# Patient Record
Sex: FEMALE | Race: WHITE | Employment: OTHER | URBAN - METROPOLITAN AREA
[De-identification: names, ages, dates, MRNs, and addresses within clinical notes are randomized per-mention and may not be internally consistent; named-entity substitution may affect disease eponyms.]

---

## 2017-02-16 DIAGNOSIS — I10 ESSENTIAL HYPERTENSION, BENIGN: ICD-10-CM

## 2017-02-16 RX ORDER — LISINOPRIL/HYDROCHLOROTHIAZIDE 10-12.5 MG
2 TABLET ORAL DAILY
Qty: 180 TABLET | Refills: 2 | Status: SHIPPED | OUTPATIENT
Start: 2017-02-16 | End: 2017-06-09

## 2017-02-16 RX ORDER — METOPROLOL SUCCINATE 50 MG/1
50 TABLET, EXTENDED RELEASE ORAL DAILY
Qty: 90 TABLET | Refills: 2 | Status: SHIPPED | OUTPATIENT
Start: 2017-02-16 | End: 2017-11-14

## 2017-02-16 NOTE — TELEPHONE ENCOUNTER
Prescription approved per [facility] [place] Refill Protocol--using diff pharm.

## 2017-02-16 NOTE — TELEPHONE ENCOUNTER
Lisinopril/HCTZ      Last Written Prescription Date: 10/26/16 (to a different pharmacy)  Last Fill Quantity: 180, # refills: 3  Last Office Visit with [place] [facility] [place], [facility] or [facility] prescribing provider: 5/16/16       Potassium   Date Value Ref Range Status   05/16/2016 3.8 3.4 - 5.3 mmol/L Final     Creatinine   Date Value Ref Range Status   05/16/2016 0.83 0.52 - 1.04 mg/dL Final     BP Readings from Last 3 Encounters:   05/16/16 120/80   05/16/16 128/80   04/19/16 138/70       Metoprolol      Last Written Prescription Date: 10/26/16 (to a different pharmacy)  Last Fill Quantity: 90, # refills: 3    Last Office Visit with [place] [facility] [place], [facility] or [facility] prescribing provider:  5/16/16   Future Office Visit: none      BP Readings from Last 3 Encounters:   05/16/16 120/80   05/16/16 128/80   04/19/16 138/70

## 2017-05-30 ENCOUNTER — HOSPITAL ENCOUNTER (OUTPATIENT)
Dept: CARDIOLOGY | Facility: CLINIC | Age: 77
Discharge: HOME | End: 2017-05-30
Attending: INTERNAL MEDICINE | Admitting: INTERNAL MEDICINE
Payer: MEDICARE

## 2017-05-30 DIAGNOSIS — I48.0 PAROXYSMAL ATRIAL FIBRILLATION (H): ICD-10-CM

## 2017-05-30 PROCEDURE — 93306 TTE W/DOPPLER COMPLETE: CPT

## 2017-05-30 PROCEDURE — 93306 TTE W/DOPPLER COMPLETE: CPT | Mod: 26 | Performed by: INTERNAL MEDICINE

## 2017-06-09 ENCOUNTER — OFFICE VISIT (OUTPATIENT)
Dept: CARDIOLOGY | Facility: CLINIC | Age: 77
End: 2017-06-09
Attending: INTERNAL MEDICINE
Payer: COMMERCIAL

## 2017-06-09 VITALS
SYSTOLIC BLOOD PRESSURE: 152 MMHG | DIASTOLIC BLOOD PRESSURE: 90 MMHG | WEIGHT: 124.8 LBS | BODY MASS INDEX: 22.11 KG/M2 | HEART RATE: 59 BPM | HEIGHT: 63 IN

## 2017-06-09 DIAGNOSIS — I48.0 PAROXYSMAL ATRIAL FIBRILLATION (H): Primary | ICD-10-CM

## 2017-06-09 DIAGNOSIS — I10 ESSENTIAL HYPERTENSION, BENIGN: ICD-10-CM

## 2017-06-09 PROCEDURE — 93000 ELECTROCARDIOGRAM COMPLETE: CPT | Performed by: INTERNAL MEDICINE

## 2017-06-09 PROCEDURE — 99213 OFFICE O/P EST LOW 20 MIN: CPT | Performed by: INTERNAL MEDICINE

## 2017-06-09 RX ORDER — LISINOPRIL AND HYDROCHLOROTHIAZIDE 12.5; 2 MG/1; MG/1
2 TABLET ORAL DAILY
Qty: 180 TABLET | Refills: 3 | Status: SHIPPED | OUTPATIENT
Start: 2017-06-09 | End: 2018-05-21

## 2017-06-09 NOTE — PROGRESS NOTES
HPI and Plan:   See dictation    Orders Placed This Encounter   Procedures     Follow-Up with Cardiologist     EKG 12-lead complete w/read - Clinics (performed today)       Orders Placed This Encounter   Medications     aspirin 81 MG tablet     Sig: Take by mouth daily     Psyllium (METAMUCIL FIBER PO)     Sig: Take by mouth daily     lisinopril-hydrochlorothiazide (PRINZIDE/ZESTORETIC) 20-12.5 MG per tablet     Sig: Take 2 tablets by mouth daily     Dispense:  180 tablet     Refill:  3       Medications Discontinued During This Encounter   Medication Reason     aspirin 325 MG tablet Dose adjustment     Docusate Calcium (STOOL SOFTENER PO) Alternate therapy     lisinopril-hydrochlorothiazide (PRINZIDE/ZESTORETIC) 10-12.5 MG per tablet          Encounter Diagnoses   Name Primary?     Paroxysmal atrial fibrillation (H) Yes     Essential hypertension, benign      BENIGN HYPERTENSION        CURRENT MEDICATIONS:  Current Outpatient Prescriptions   Medication Sig Dispense Refill     aspirin 81 MG tablet Take by mouth daily       Psyllium (METAMUCIL FIBER PO) Take by mouth daily       lisinopril-hydrochlorothiazide (PRINZIDE/ZESTORETIC) 20-12.5 MG per tablet Take 2 tablets by mouth daily 180 tablet 3     metoprolol (TOPROL-XL) 50 MG 24 hr tablet Take 1 tablet (50 mg) by mouth daily 90 tablet 2     flecainide (TAMBOCOR) 50 MG tablet Take 1 tablet (50 mg) by mouth 2 times daily 180 tablet 3     Multiple Vitamins-Minerals (PRESERVISION/LUTEIN PO) Take 2 capsules by mouth daily       Calcium Carbonate-Vitamin D (CALCIUM 600 + D OR) Take 1 tablet by mouth daily         ALLERGIES     Allergies   Allergen Reactions     Meperidine Hcl      demerol= nausea       PAST MEDICAL HISTORY:  Past Medical History:   Diagnosis Date     Actinic keratosis     multiple, neris on back      Atrial fibrillation (H)     ablation @ [facility] 1/4/07     Diffuse cystic mastopathy     has prominent chronic L breast cystic structure     Essential  hypertension, benign      First degree AV block 5/9/2016    Per Holter 9/18/15      Generalized osteoarthrosis, unspecified site     hands, L hip     Leiomyoma of uterus, unspecified      S/P AV conner ablation     At [facility], remote history      Unspecified arthropathy, pelvic region and thigh     L hip     Urethral stricture unspecified     inactive       PAST SURGICAL HISTORY:  Past Surgical History:   Procedure Laterality Date     C NONSPECIFIC PROCEDURE      numerous breast aspirations     C NONSPECIFIC PROCEDURE      T&A     C NONSPECIFIC PROCEDURE      left breast bx     C NONSPECIFIC PROCEDURE      numerous breast aspirations     C NONSPECIFIC PROCEDURE      tubal lig (remote)     s/p AV conner ablation      At [facility], remote history      wisdom teeth         FAMILY HISTORY:  Family History   Problem Relation Age of Onset     Arthritis Mother      Neurologic Disorder Mother      parkinsons     Hypertension Mother      Hypertension Father      CEREBROVASCULAR DISEASE Father      2     HEART DISEASE Father      mi x 2     GASTROINTESTINAL DISEASE Father      bleeding ulcers     CANCER Father      prostate     Hypertension Brother      Breast Cancer Maternal Grandmother        SOCIAL HISTORY:  Social History     Social History     Marital status:      Spouse name: Jere     Number of children: 3     Years of education: N/A     Occupational History     volunteer      president of  [employer]     Social History Main Topics     Smoking status: Former Smoker     Smokeless tobacco: Never Used     Alcohol use 0.0 oz/week     0 Standard drinks or equivalent per week      Comment: 1 glass wine per day     Drug use: No     Sexual activity: Yes     Partners: Male     Birth control/ protection: Post-menopausal     Other Topics Concern     Caffeine Concern No     none     Exercise Yes     4-5 a week. bike ,walk     Social History Narrative       Review of Systems:  Skin:  Negative       Eyes:  Positive for  "glasses    ENT:  Negative      Respiratory:  Negative       Cardiovascular:  Negative      Gastroenterology: Negative      Genitourinary:  not assessed      Musculoskeletal:  Positive for arthritis    Neurologic:  Negative      Psychiatric:  Negative      Heme/Lymph/Imm:  Negative      Endocrine:  Positive for night sweats occ    Physical Exam:  Vitals: /90  Pulse 59  Ht 1.6 m (5' 3\")  Wt 56.6 kg (124 lb 12.8 oz)  BMI 22.11 kg/m2    Constitutional:  cooperative, alert and oriented, well developed, well nourished, in no acute distress        Skin:  warm and dry to the touch, no apparent skin lesions or masses noted        Head:  normocephalic, no masses or lesions        Eyes:  pupils equal and round, conjunctivae and lids unremarkable, sclera white, no xanthalasma, EOMS intact, no nystagmus        ENT:  no pallor or cyanosis, dentition good        Neck:  carotid pulses are full and equal bilaterally, JVP normal, no carotid bruit, no thyromegaly        Chest:  normal breath sounds, clear to auscultation, normal A-P diameter, normal symmetry, normal respiratory excursion, no use of accessory muscles          Cardiac: regular rhythm, normal S1/S2, no S3 or S4, apical impulse not displaced, no murmurs, gallops or rubs                  Abdomen:  abdomen soft, non-tender, BS normoactive, no mass, no HSM, no bruits        Vascular: pulses full and equal, no bruits auscultated                                        Extremities and Back:  no deformities, clubbing, cyanosis, erythema observed;no edema              Neurological:  affect appropriate, oriented to time, person and place;no gross motor deficits              CC  Ivan Park MD   [facility]  [address]  [place] [address]              "

## 2017-06-09 NOTE — MR AVS SNAPSHOT
"              After Visit Summary   6/9/2017    Mariluz Echeverria    MRN: [id number]           Patient Information     Date Of Birth          1940        Visit Information        Provider Department      6/9/2017 9:15 AM Ivan Park MD [facility]        Today's Diagnoses     Paroxysmal atrial fibrillation (H)    -  1    Essential hypertension, benign        BENIGN HYPERTENSION           Follow-ups after your visit        Additional Services     Follow-Up with Cardiologist                 Future tests that were ordered for you today     Open Future Orders        Priority Expected Expires Ordered    Follow-Up with Cardiologist Routine 6/9/2018 10/22/2018 6/9/2017            Who to contact     If you have questions or need follow up information about today's clinic visit or your schedule please contact [facility] directly at [phone].  Normal or non-critical lab and imaging results will be communicated to you by MyChart, letter or phone within 4 business days after the clinic has received the results. If you do not hear from us within 7 days, please contact the clinic through MyChart or phone. If you have a critical or abnormal lab result, we will notify you by phone as soon as possible.  Submit refill requests through [employer] or call your pharmacy and they will forward the refill request to us. Please allow 3 business days for your refill to be completed.          Additional Information About Your Visit        MyChart Information     [employer] lets you send messages to your doctor, view your test results, renew your prescriptions, schedule appointments and more. To sign up, go to [email or web address] . Click on \"Log in\" on the left side of the screen, which will take you to the Welcome page. Then click on \"Sign up Now\" on the right side of the page.     You will be asked to enter the access code listed below, as well as " "some personal information. Please follow the directions to create your username and password.     Your access code is: [email or web address]  Expires: 2017 10:28 AM     Your access code will  in 90 days. If you need help or a new code, please call your [place] clinic or [phone].        Care EveryWhere ID     This is your Care EveryWhere ID. This could be used by other organizations to access your [place] medical records  [id number]        Your Vitals Were     Pulse Height BMI (Body Mass Index)             59 1.6 m (5' 3\") 22.11 kg/m2          Blood Pressure from Last 3 Encounters:   17 152/90   16 120/80   16 128/80    Weight from Last 3 Encounters:   17 56.6 kg (124 lb 12.8 oz)   16 56.2 kg (124 lb)   16 56.1 kg (123 lb 9.6 oz)              We Performed the Following     EKG 12-lead complete w/read - Clinics (performed today)     Follow-Up with Cardiologist          Today's Medication Changes          These changes are accurate as of: 17 10:28 AM.  If you have any questions, ask your nurse or doctor.               Start taking these medicines.        Dose/Directions    lisinopril-hydrochlorothiazide 20-12.5 MG per tablet   Commonly known as:  PRINZIDE/ZESTORETIC   Used for:  Essential hypertension, benign   Replaces:  lisinopril-hydrochlorothiazide 10-12.5 MG per tablet   Started by:  Ivan Park MD        Dose:  2 tablet   Take 2 tablets by mouth daily   Quantity:  180 tablet   Refills:  3         These medicines have changed or have updated prescriptions.        Dose/Directions    aspirin 81 MG tablet   This may have changed:  Another medication with the same name was removed. Continue taking this medication, and follow the directions you see here.   Changed by:  Ivan Park MD        Take by mouth daily   Refills:  0         Stop taking these medicines if you haven't already. Please contact your care team if you have questions.     " lisinopril-hydrochlorothiazide 10-12.5 MG per tablet   Commonly known as:  PRINZIDE/ZESTORETIC   Replaced by:  lisinopril-hydrochlorothiazide 20-12.5 MG per tablet   Stopped by:  Ivan Park MD           STOOL SOFTENER PO   Stopped by:  Ivan Park MD                Where to get your medicines      These medications were sent to [facility] [place] [id number] [facility]  [address] [facility] [place] [address]     Phone:  [phone]     lisinopril-hydrochlorothiazide 20-12.5 MG per tablet                Primary Care Provider Office Phone # Fax #    Yohana Mima Falk [phone]       [facility] [address]  [facility] [address]        Thank you!     Thank you for choosing [facility] [place]  for your care. Our goal is always to provide you with excellent care. Hearing back from our patients is one way we can continue to improve our services. Please take a few minutes to complete the written survey that you may receive in the mail after your visit with us. Thank you!             Your Updated Medication List - Protect others around you: Learn how to safely use, store and throw away your medicines at www.disposemymeds.org.          This list is accurate as of: 6/9/17 10:28 AM.  Always use your most recent med list.                   Brand Name Dispense Instructions for use    aspirin 81 MG tablet      Take by mouth daily       CALCIUM 600 + D PO      Take 1 tablet by mouth daily       flecainide 50 MG tablet    TAMBOCOR    180 tablet    Take 1 tablet (50 mg) by mouth 2 times daily       lisinopril-hydrochlorothiazide 20-12.5 MG per tablet    PRINZIDE/ZESTORETIC    180 tablet    Take 2 tablets by mouth daily       METAMUCIL FIBER PO      Take by mouth daily       metoprolol 50 MG 24 hr tablet    TOPROL-XL    90 tablet    Take 1 tablet (50 mg) by mouth daily       PRESERVISION/LUTEIN PO      Take 2  capsules by mouth daily

## 2017-06-09 NOTE — LETTER
6/9/2017    Yohana Falk MD  [address] [facility] [address]  [facility] [address]    RE: Mariluz Echeverria       Dear Colleague,    I had the pleasure of seeing Mariluz Echeverria in the [facility].    I had the opportunity to see Ms. Mariluz Echeverria in Cardiology Clinic today at the [facility] in [place] for reevaluation of paroxysmal atrial fibrillation and hypertension.  As you may recall, she is a 76-year-old woman who is very healthy and active and continues to do well.  She has a history of recurrent atrial fibrillation in the past treated with atrial fibrillation ablation at [facility].  Initially the ablation worked well and she was able to get off anticoagulation and antiarrhythmic drug therapy.  She then had a few episodes of recurrent atrial fibrillation and was placed back on a low dose of flecainide 50 mg p.o. b.i.d. for atrial fibrillation suppression.  She did not tolerate diltiazem for AV conner blockade due to side effects, including constipation.  She was then switched to metoprolol XL and has tolerated that reasonably well.  She continues to be completely free of any symptoms of recurrent atrial fibrillation for the last 5-1/2 years.  She was quite aware of the irregular pounding sensation of her chest when the atrial fibrillation would occur and is quite confident that she has not had any atrial fibrillation recently.      She seemed to do well without any symptoms of recurrent shortness of breath, lightheadedness or chest discomfort.  Her blood pressures tend to be on the high side as they are today.      PHYSICAL EXAMINATION:  Blood pressure was 152/90, heart rate 59 and weight 124 pounds.  Her lungs are clear.  Heart rhythm is quite regular.  She has no cardiac murmurs or carotid bruits.     Outpatient Encounter Prescriptions as of 6/9/2017   Medication Sig Dispense Refill     aspirin 81 MG tablet Take by mouth daily       Psyllium  (METAMUCIL FIBER PO) Take by mouth daily       lisinopril-hydrochlorothiazide (PRINZIDE/ZESTORETIC) 20-12.5 MG per tablet Take 2 tablets by mouth daily 180 tablet 3     metoprolol (TOPROL-XL) 50 MG 24 hr tablet Take 1 tablet (50 mg) by mouth daily 90 tablet 2     flecainide (TAMBOCOR) 50 MG tablet Take 1 tablet (50 mg) by mouth 2 times daily 180 tablet 3     Multiple Vitamins-Minerals (PRESERVISION/LUTEIN PO) Take 2 capsules by mouth daily       Calcium Carbonate-Vitamin D (CALCIUM 600 + D OR) Take 1 tablet by mouth daily       [DISCONTINUED] lisinopril-hydrochlorothiazide (PRINZIDE/ZESTORETIC) 10-12.5 MG per tablet Take 2 tablets by mouth daily 180 tablet 2     [DISCONTINUED] Docusate Calcium (STOOL SOFTENER PO) Take 1 capsule by mouth daily       [DISCONTINUED] aspirin 325 MG tablet Take 325 mg by mouth daily.       No facility-administered encounter medications on file as of 6/9/2017.       IMPRESSIONS:  Ms. Mariluz Echeverria is a 76-year-old woman with hypertension and history of paroxysmal atrial fibrillation and treated somewhat successfully with ablation at [facility] many years ago.  She had from a few breakthrough episodes of atrial fibrillation after that but those seem to have been suppressed nicely with flecainide 50 mg p.o. b.i.d. consistently for the last 5 or 6 years.  At this point, she is not on anticoagulation.  We discussed that again this year and will continue her on her current therapy with the advice that she contact us immediately if she has any recurrent atrial fibrillation so that she can have started on anticoagulation.  I will increase the lisinopril component of her lisinopril/hydrochlorothiazide combination.  She will take 20/12.5, two tablets daily for better blood pressure control.      I will plan to see her back again in 1 year.     Again, thank you for allowing me to participate in the care of your patient.      Sincerely,    DIAZ GONZALEZ MD     [facility]  [facility]

## 2017-06-12 NOTE — PROGRESS NOTES
HISTORY OF PRESENT ILLNESS:  I had the opportunity to see Ms. Mariluz Echeverria in Cardiology Clinic today at the [facility] in [place] for reevaluation of paroxysmal atrial fibrillation and hypertension.  As you may recall, she is a 76-year-old woman who is very healthy and active and continues to do well.  She has a history of recurrent atrial fibrillation in the past treated with atrial fibrillation ablation at [facility].  Initially the ablation worked well and she was able to get off anticoagulation and antiarrhythmic drug therapy.  She then had a few episodes of recurrent atrial fibrillation and was placed back on a low dose of flecainide 50 mg p.o. b.i.d. for atrial fibrillation suppression.  She did not tolerate diltiazem for AV conner blockade due to side effects, including constipation.  She was then switched to metoprolol XL and has tolerated that reasonably well.  She continues to be completely free of any symptoms of recurrent atrial fibrillation for the last 5-1/2 years.  She was quite aware of the irregular pounding sensation of her chest when the atrial fibrillation would occur and is quite confident that she has not had any atrial fibrillation recently.      She seemed to do well without any symptoms of recurrent shortness of breath, lightheadedness or chest discomfort.  Her blood pressures tend to be on the high side as they are today.      PHYSICAL EXAMINATION:  Blood pressure was 152/90, heart rate 59 and weight 124 pounds.  Her lungs are clear.  Heart rhythm is quite regular.  She has no cardiac murmurs or carotid bruits.      IMPRESSIONS:  Ms. Mariluz Echeverria is a 76-year-old woman with hypertension and history of paroxysmal atrial fibrillation and treated somewhat successfully with ablation at [facility] many years ago.  She had from a few breakthrough episodes of atrial fibrillation after that but those seem to have been suppressed nicely with flecainide 50 mg  p.o. b.i.d. consistently for the last 5 or 6 years.  At this point, she is not on anticoagulation.  We discussed that again this year and will continue her on her current therapy with the advice that she contact us immediately if she has any recurrent atrial fibrillation so that she can have started on anticoagulation.  I will increase the lisinopril component of her lisinopril/hydrochlorothiazide combination.  She will take 20/12.5, two tablets daily for better blood pressure control.      I will plan to see her back again in 1 year.      cc:      Yohana Falk MD    [facility]   [address]   [place] [address]         DIAZ GONZALEZ MD, [facility]             D: 2017 17:29   T: 2017 08:51   MT: NAVIN      Name:     MELANIA MORENO   MRN:      [id number]        Account:      [id number]   :      1940           Service Date: 2017      Document: [id number]

## 2017-06-20 ENCOUNTER — TELEPHONE (OUTPATIENT)
Dept: CARDIOLOGY | Facility: CLINIC | Age: 77
End: 2017-06-20

## 2017-06-20 DIAGNOSIS — I10 BENIGN ESSENTIAL HYPERTENSION: Primary | ICD-10-CM

## 2017-06-20 NOTE — TELEPHONE ENCOUNTER
Called concerned about her BP. At OV with Dr. Park on 6/9/17 Lisinopril-HCTZ was increased to 20-12.5 mg 2 tablets daily for elevated BP of 152/90.  Update of BP remains elevated, and would like to get the medication adjustments done while here in [place], [place]   BP's over the past days were taken frequently.  6/16 - 160/90  6/17-- low of 130/86, high of 152/93  6/18-  Low of 123/73, high of 152/90  Asking for BP's of her at rest, similar times of the day rancged from 130//93.   Also on Toprol XL 50 mg daily. Takes in the evening. HR 60-65 bpm.    Will message Dr. Park for his recommendations. Patient aware his is working in Hospital this week.

## 2017-07-03 RX ORDER — AMLODIPINE BESYLATE 5 MG/1
TABLET ORAL
Qty: 30 TABLET | Refills: 11 | Status: SHIPPED | OUTPATIENT
Start: 2017-07-03 | End: 2017-08-03

## 2017-07-03 NOTE — TELEPHONE ENCOUNTER
Phone call to patient to tell her that Dr. Park recommends adding amlodipine 5 MG in the am. i cautioned her that it has the potential to cause some puffiness of feet and ankles and to let us know if this develops. I advised her to check BP's 1-2 hours after taking her BP meds. She agrees with this plan and will keep us informed. Archana

## 2017-07-12 NOTE — TELEPHONE ENCOUNTER
Voice message received from patient that since starting the amlodipine 5 mg for elevated BP, her BP's are now running 120-133 systolic prior to medications and 117-113 after her medications. States she is doing well, just wanted to give us an update on her BP's. No call back needed.

## 2017-08-03 DIAGNOSIS — I10 BENIGN ESSENTIAL HYPERTENSION: ICD-10-CM

## 2017-08-03 RX ORDER — AMLODIPINE BESYLATE 5 MG/1
TABLET ORAL
Qty: 90 TABLET | Refills: 3 | Status: SHIPPED | OUTPATIENT
Start: 2017-08-03 | End: 2018-07-30

## 2017-08-08 ENCOUNTER — HOSPITAL ENCOUNTER (OUTPATIENT)
Dept: MAMMOGRAPHY | Facility: CLINIC | Age: 77
Discharge: HOME | End: 2017-08-08
Attending: INTERNAL MEDICINE | Admitting: INTERNAL MEDICINE
Payer: MEDICARE

## 2017-08-08 ENCOUNTER — OFFICE VISIT (OUTPATIENT)
Dept: INTERNAL MEDICINE | Facility: CLINIC | Age: 77
End: 2017-08-08
Payer: COMMERCIAL

## 2017-08-08 VITALS
SYSTOLIC BLOOD PRESSURE: 118 MMHG | TEMPERATURE: 98.4 F | HEART RATE: 64 BPM | HEIGHT: 63 IN | WEIGHT: 122.7 LBS | DIASTOLIC BLOOD PRESSURE: 76 MMHG | OXYGEN SATURATION: 97 % | BODY MASS INDEX: 21.74 KG/M2

## 2017-08-08 DIAGNOSIS — Z12.31 VISIT FOR SCREENING MAMMOGRAM: ICD-10-CM

## 2017-08-08 DIAGNOSIS — M54.41 RIGHT-SIDED LOW BACK PAIN WITH RIGHT-SIDED SCIATICA, UNSPECIFIED CHRONICITY: ICD-10-CM

## 2017-08-08 DIAGNOSIS — Z00.00 ROUTINE GENERAL MEDICAL EXAMINATION AT A HEALTH CARE FACILITY: Primary | ICD-10-CM

## 2017-08-08 DIAGNOSIS — I48.0 PAROXYSMAL ATRIAL FIBRILLATION (H): ICD-10-CM

## 2017-08-08 PROCEDURE — 77063 BREAST TOMOSYNTHESIS BI: CPT

## 2017-08-08 PROCEDURE — 99397 PER PM REEVAL EST PAT 65+ YR: CPT | Performed by: INTERNAL MEDICINE

## 2017-08-08 NOTE — PROGRESS NOTES
SUBJECTIVE:   Mariluz Echeverria is a 77 year old female who presents for Preventive Visit.      Are you in the first 12 months of your Medicare Part B coverage?  No    Healthy Habits:    Do you get at least three servings of calcium containing foods daily (dairy, green leafy vegetables, etc.)? yes    Amount of exercise or daily activities, outside of work: 5 day(s) per week    Problems taking medications regularly No    Medication side effects: No    Have you had an eye exam in the past two years? yes    Do you see a dentist twice per year? no    Do you have sleep apnea, excessive snoring or daytime drowsiness?no    COGNITIVE SCREEN  1) Repeat 3 items (Banana, Sunrise, Chair)    2) Clock draw: NORMAL  3) 3 item recall: Recalls 3 objects  Results: 3 items recalled: COGNITIVE IMPAIRMENT LESS LIKELY    Mini-CogTM Copyright S Esperanza. Licensed by the author for use in [facility]; reprinted with permission (miriam@[email or web address]). All rights reserved.                    Reviewed and updated as needed this visit by clinical staffTobacco  Allergies  Meds  Med Hx  Surg Hx  Fam Hx  Soc Hx        Reviewed and updated as needed this visit by Provider        Social History   Substance Use Topics     Smoking status: Former Smoker     Smokeless tobacco: Never Used     Alcohol use 0.0 oz/week     0 Standard drinks or equivalent per week      Comment: 1 glass wine per day       The patient does not drink >3 drinks per day nor >7 drinks per week.    Today's PHQ-2 Score:   PHQ-2 ( 1999 [employer]) 8/8/2017 5/16/2016   Q1: Little interest or pleasure in doing things 0 0   Q2: Feeling down, depressed or hopeless 0 0   PHQ-2 Score 0 0         Do you feel safe in your environment - No    Do you have a Health Care Directive?: Yes: Advance Directive has been received and scanned.    Current providers sharing in care for this patient include: Patient Care Team:  Yohana Falk MD as PCP - General (Internal Medicine)      Hearing  impairment: No    Ability to successfully perform activities of daily living: Yes, no assistance needed     Fall risk:  Fallen 2 or more times in the past year?: No  Any fall with injury in the past year?: No      Home safety:  none identified      The following health maintenance items are reviewed in Epic and correct as of today:Health Maintenance   Topic Date Due     ADVANCE DIRECTIVE PLANNING Q5 YRS  07/07/2016     PNEUMOCOCCAL (2 of 2 - PPSV23) 09/08/2016     FALL RISK ASSESSMENT  04/19/2017     INFLUENZA VACCINE (SYSTEM ASSIGNED)  09/01/2017     COLONOSCOPY Q5 YR  12/27/2017     TETANUS IMMUNIZATION (SYSTEM ASSIGNED)  09/03/2019     LIPID SCREEN Q5 YR FEMALE (SYSTEM ASSIGNED)  05/16/2021     DEXA SCAN SCREENING (SYSTEM ASSIGNED)  Completed     BP Readings from Last 3 Encounters:   08/08/17 118/76   06/09/17 152/90   05/16/16 120/80    Wt Readings from Last 3 Encounters:   08/08/17 122 lb 11.2 oz (55.7 kg)   06/09/17 124 lb 12.8 oz (56.6 kg)   05/16/16 124 lb (56.2 kg)                  Patient Active Problem List   Diagnosis     Arthropathy of pelvic region and thigh     Diffuse cystic mastopathy     Urethral stricture     Essential hypertension, benign     Postmenopausal atrophic vaginitis     CARDIOVASCULAR SCREENING; LDL GOAL LESS THAN 130     Advanced directives, counseling/discussion     Urinary retention     First degree AV block     S/P AV conner ablation     Paroxysmal atrial fibrillation (H)     Past Surgical History:   Procedure Laterality Date     C NONSPECIFIC PROCEDURE      numerous breast aspirations     C NONSPECIFIC PROCEDURE      T&A     C NONSPECIFIC PROCEDURE      left breast bx     C NONSPECIFIC PROCEDURE      numerous breast aspirations     C NONSPECIFIC PROCEDURE      tubal lig (remote)     s/p AV conner ablation      At [facility], remote history      wisdom teeth         Social History   Substance Use Topics     Smoking status: Former Smoker     Smokeless tobacco: Never Used     Alcohol use  0.0 oz/week     0 Standard drinks or equivalent per week      Comment: 1 glass wine per day     Family History   Problem Relation Age of Onset     Arthritis Mother      Neurologic Disorder Mother      parkinsons     Hypertension Mother      Hypertension Father      CEREBROVASCULAR DISEASE Father      2     HEART DISEASE Father      mi x 2     GASTROINTESTINAL DISEASE Father      bleeding ulcers     CANCER Father      prostate     Hypertension Brother      Breast Cancer Maternal Grandmother          Current Outpatient Prescriptions   Medication Sig Dispense Refill     amLODIPine (NORVASC) 5 MG tablet Take one 5 MG pill by mouth in the morning 90 tablet 3     aspirin 81 MG tablet Take by mouth daily       Psyllium (METAMUCIL FIBER PO) Take by mouth daily       lisinopril-hydrochlorothiazide (PRINZIDE/ZESTORETIC) 20-12.5 MG per tablet Take 2 tablets by mouth daily 180 tablet 3     metoprolol (TOPROL-XL) 50 MG 24 hr tablet Take 1 tablet (50 mg) by mouth daily 90 tablet 2     flecainide (TAMBOCOR) 50 MG tablet Take 1 tablet (50 mg) by mouth 2 times daily 180 tablet 3     Multiple Vitamins-Minerals (PRESERVISION/LUTEIN PO) Take 2 capsules by mouth daily       Calcium Carbonate-Vitamin D (CALCIUM 600 + D OR) Take 1 tablet by mouth daily                 ROS:  C: NEGATIVE for fever, chills, change in weight  I: NEGATIVE for worrisome rashes, moles or lesions  E: NEGATIVE for vision changes or irritation  E/M: NEGATIVE for ear, mouth and throat problems  R: NEGATIVE for significant cough or SOB  B: NEGATIVE for masses, tenderness or discharge  CV: NEGATIVE for chest pain, palpitations or peripheral edema  GI: NEGATIVE for nausea, abdominal pain, heartburn, or change in bowel habits  : NEGATIVE for frequency, dysuria, or hematuria  MUSCULOSKELETAL:she has ongoing right lower leg pain and right buttock pain at times.   N: NEGATIVE for weakness, dizziness or paresthesias  E: NEGATIVE for temperature intolerance, skin/hair  "changes  H: NEGATIVE for bleeding problems  P: NEGATIVE for changes in mood or affect    OBJECTIVE:   /76  Pulse 64  Temp 98.4  F (36.9  C) (Oral)  Ht 5' 3\" (1.6 m)  Wt 122 lb 11.2 oz (55.7 kg)  SpO2 97%  BMI 21.74 kg/m2 Estimated body mass index is 21.74 kg/(m^2) as calculated from the following:    Height as of this encounter: 5' 3\" (1.6 m).    Weight as of this encounter: 122 lb 11.2 oz (55.7 kg).  EXAM:   GENERAL: healthy, alert and no distress  EYES: Eyes grossly normal to inspection, PERRL and conjunctivae and sclerae normal  HENT: ear canals and TM's normal, nose and mouth without ulcers or lesions  NECK: no adenopathy, no asymmetry, masses, or scars and thyroid normal to palpation  RESP: lungs clear to auscultation - no rales, rhonchi or wheezes  BREAST: normal without masses, tenderness or nipple discharge and no palpable axillary masses or adenopathy  CV: regular rate and rhythm, normal S1 S2, no S3 or S4, no murmur, click or rub, no peripheral edema and peripheral pulses strong  ABDOMEN: soft, nontender, no hepatosplenomegaly, no masses and bowel sounds normal  MS: no gross musculoskeletal defects noted, no edema  SKIN: no suspicious lesions or rashes  NEURO: Normal strength and tone, mentation intact and speech normal  PSYCH: mentation appears normal, affect normal/bright    ASSESSMENT / PLAN:   1. Routine general medical examination at a health care facility     - CBC with platelets; Future  - TSH with free T4 reflex; Future  - Lipid panel reflex to direct LDL; Future  - Comprehensive metabolic panel (BMP + Alb, Alk Phos, ALT, AST, Total. Bili, TP); Future    2. Right-sided low back pain with right-sided sciatica, unspecified chronicity  Offered to check MRI lumbar spine. She declined for now.     3. Paroxysmal atrial fibrillation (H)  under good control Continue current medications.       End of Life Planning:  Patient currently has an advanced directive: No.  I have verified the patient's " "ablity to prepare an advanced directive/make health care decisions.  Literature was provided to assist patient in preparing an advanced directive.    COUNSELING:  Reviewed preventive health counseling, as reflected in patient instructions       Regular exercise       Healthy diet/nutrition        Estimated body mass index is 21.74 kg/(m^2) as calculated from the following:    Height as of this encounter: 5' 3\" (1.6 m).    Weight as of this encounter: 122 lb 11.2 oz (55.7 kg).     reports that she has quit smoking. She has never used smokeless tobacco.        Appropriate preventive services were discussed with this patient, including applicable screening as appropriate for cardiovascular disease, diabetes, osteopenia/osteoporosis, and glaucoma.  As appropriate for age/gender, discussed screening for colorectal cancer, prostate cancer, breast cancer, and cervical cancer. Checklist reviewing preventive services available has been given to the patient.    Reviewed patients plan of care and provided an AVS. The Basic Care Plan (routine screening as documented in Health Maintenance) for Mariluz meets the Care Plan requirement. This Care Plan has been established and reviewed with the Patient.    Counseling Resources:  ATP IV Guidelines  Pooled Cohorts Equation Calculator  Breast Cancer Risk Calculator  FRAX Risk Assessment  ICSI Preventive Guidelines  Dietary Guidelines for Americans, 2010  XTRM's MyPlate  ASA Prophylaxis  Lung CA Screening    Yohana Falk MD  [facility]  "

## 2017-08-08 NOTE — MR AVS SNAPSHOT
After Visit Summary   8/8/2017    Mariluz Echeverria    MRN: [id number]           Patient Information     Date Of Birth          1940        Visit Information        Provider Department      8/8/2017 9:20 AM Yohana Falk MD [facility]        Today's Diagnoses     Routine general medical examination at a health care facility    -  1    Right-sided low back pain with right-sided sciatica, unspecified chronicity        Paroxysmal atrial fibrillation (H)          Care Instructions      Preventive Health Recommendations    Female Ages 65 +    Yearly exam:     See your health care provider every year in order to  o Review health changes.   o Discuss preventive care.    o Review your medicines if your doctor has prescribed any.      You no longer need a yearly Pap test unless you've had an abnormal Pap test in the past 10 years. If you have vaginal symptoms, such as bleeding or discharge, be sure to talk with your provider about a Pap test.      Every 1 to 2 years, have a mammogram.  If you are over 69, talk with your health care provider about whether or not you want to continue having screening mammograms.      Every 10 years, have a colonoscopy. Or, have a yearly FIT test (stool test). These exams will check for colon cancer.       Have a cholesterol test every 5 years, or more often if your doctor advises it.       Have a diabetes test (fasting glucose) every three years. If you are at risk for diabetes, you should have this test more often.       At age 65, have a bone density scan (DEXA) to check for osteoporosis (brittle bone disease).    Shots:    Get a flu shot each year.    Get a tetanus shot every 10 years.    Talk to your doctor about your pneumonia vaccines. There are now two you should receive - Pneumovax (PPSV 23) and Prevnar (PCV 13).    Talk to your doctor about the shingles vaccine.    Talk to your doctor about the hepatitis B vaccine.    Nutrition:     Eat at least  5 servings of fruits and vegetables each day.      Eat whole-grain bread, whole-wheat pasta and brown rice instead of white grains and rice.      Talk to your provider about Calcium and Vitamin D.     Lifestyle    Exercise at least 150 minutes a week (30 minutes a day, 5 days a week). This will help you control your weight and prevent disease.      Limit alcohol to one drink per day.      No smoking.       Wear sunscreen to prevent skin cancer.       See your dentist twice a year for an exam and cleaning.      See your eye doctor every 1 to 2 years to screen for conditions such as glaucoma, macular degeneration and cataracts.          Follow-ups after your visit        Your next 10 appointments already scheduled     Aug 15, 2017   Procedure with Leonarda Gaona MD   [facility] ([facility])    [address] [place] [address] [phone]           [facility] is located at [address] [place]              Future tests that were ordered for you today     Open Future Orders        Priority Expected Expires Ordered    CBC with platelets Routine  11/8/2017 8/8/2017    TSH with free T4 reflex Routine  11/8/2017 8/8/2017    Lipid panel reflex to direct LDL Routine  11/8/2017 8/8/2017    Comprehensive metabolic panel (BMP + Alb, Alk Phos, ALT, AST, Total. Bili, TP) Routine  11/8/2017 8/8/2017    MA Screen Bilateral w/Leonel Routine  8/8/2018 8/7/2017            Who to contact     If you have questions or need follow up information about today's clinic visit or your schedule please contact [facility] directly at [phone].  Normal or non-critical lab and imaging results will be communicated to you by MyChart, letter or phone within 4 business days after the clinic has received the results. If you do not hear from us within 7 days, please contact the clinic through MyChart or phone. If you have a critical or abnormal lab result, we  "will notify you by phone as soon as possible.  Submit refill requests through [employer] or call your pharmacy and they will forward the refill request to us. Please allow 3 business days for your refill to be completed.          Additional Information About Your Visit        logtrusthart Information     [employer] lets you send messages to your doctor, view your test results, renew your prescriptions, schedule appointments and more. To sign up, go to [email or web address] . Click on \"Log in\" on the left side of the screen, which will take you to the Welcome page. Then click on \"Sign up Now\" on the right side of the page.     You will be asked to enter the access code listed below, as well as some personal information. Please follow the directions to create your username and password.     Your access code is: [email or web address]  Expires: 2017 10:28 AM     Your access code will  in 90 days. If you need help or a new code, please call your [place] clinic or [phone].        Care EveryWhere ID     This is your Care EveryWhere ID. This could be used by other organizations to access your [place] medical records  [id number]        Your Vitals Were     Pulse Temperature Height Pulse Oximetry BMI (Body Mass Index)       64 98.4  F (36.9  C) (Oral) 5' 3\" (1.6 m) 97% 21.74 kg/m2        Blood Pressure from Last 3 Encounters:   17 118/76   17 152/90   16 120/80    Weight from Last 3 Encounters:   17 122 lb 11.2 oz (55.7 kg)   17 124 lb 12.8 oz (56.6 kg)   16 124 lb (56.2 kg)               Primary Care Provider Office Phone # Fax #    Yohana Falk [phone]       [facility]  [address]   [facility] [address]        Equal Access to Services     LEILANI HAYNES AH: Hadii elvira weiss Socas, waaxda luqadaha, qaybta jayjay espana ah. [facility] [phone].    ATENCIÓN: Si lilia baer a cuba " disposición servicios gratuitos de asistencia lingüística. Ava simmons [phone].    We comply with applicable federal civil rights laws and [place] laws. We do not discriminate on the basis of race, color, national origin, age, disability sex, sexual orientation or gender identity.            Thank you!     Thank you for choosing [facility]  for your care. Our goal is always to provide you with excellent care. Hearing back from our patients is one way we can continue to improve our services. Please take a few minutes to complete the written survey that you may receive in the mail after your visit with us. Thank you!             Your Updated Medication List - Protect others around you: Learn how to safely use, store and throw away your medicines at www.disposemymeds.org.          This list is accurate as of: 8/8/17 10:00 AM.  Always use your most recent med list.                   Brand Name Dispense Instructions for use Diagnosis    amLODIPine 5 MG tablet    NORVASC    90 tablet    Take one 5 MG pill by mouth in the morning    Benign essential hypertension       aspirin 81 MG tablet      Take by mouth daily        CALCIUM 600 + D PO      Take 1 tablet by mouth daily        flecainide 50 MG tablet    TAMBOCOR    180 tablet    Take 1 tablet (50 mg) by mouth 2 times daily    Paroxysmal atrial fibrillation (H)       lisinopril-hydrochlorothiazide 20-12.5 MG per tablet    PRINZIDE/ZESTORETIC    180 tablet    Take 2 tablets by mouth daily    Essential hypertension, benign       METAMUCIL FIBER PO      Take by mouth daily        metoprolol 50 MG 24 hr tablet    TOPROL-XL    90 tablet    Take 1 tablet (50 mg) by mouth daily    Essential hypertension, benign       PRESERVISION/LUTEIN PO      Take 2 capsules by mouth daily

## 2017-08-08 NOTE — NURSING NOTE
"Chief Complaint   Patient presents with     Medicare Visit     Not fasting       Initial /76  Pulse 64  Temp 98.4  F (36.9  C) (Oral)  Ht 5' 3\" (1.6 m)  Wt 122 lb 11.2 oz (55.7 kg)  SpO2 97%  BMI 21.74 kg/m2 Estimated body mass index is 21.74 kg/(m^2) as calculated from the following:    Height as of this encounter: 5' 3\" (1.6 m).    Weight as of this encounter: 122 lb 11.2 oz (55.7 kg).  Medication Reconciliation: complete     Lynnette Mcintosh CMA      "

## 2017-08-10 ENCOUNTER — TRANSFERRED RECORDS (OUTPATIENT)
Dept: HEALTH INFORMATION MANAGEMENT | Facility: CLINIC | Age: 77
End: 2017-08-10

## 2017-08-11 DIAGNOSIS — Z00.00 ROUTINE GENERAL MEDICAL EXAMINATION AT A HEALTH CARE FACILITY: ICD-10-CM

## 2017-08-11 LAB
ALBUMIN SERPL-MCNC: 3.6 G/DL (ref 3.4–5)
ALP SERPL-CCNC: 45 U/L (ref 40–150)
ALT SERPL W P-5'-P-CCNC: 21 U/L (ref 0–50)
ANION GAP SERPL CALCULATED.3IONS-SCNC: 5 MMOL/L (ref 3–14)
AST SERPL W P-5'-P-CCNC: 22 U/L (ref 0–45)
BILIRUB SERPL-MCNC: 0.7 MG/DL (ref 0.2–1.3)
BUN SERPL-MCNC: 18 MG/DL (ref 7–30)
CALCIUM SERPL-MCNC: 9.1 MG/DL (ref 8.5–10.1)
CHLORIDE SERPL-SCNC: 99 MMOL/L (ref 94–109)
CHOLEST SERPL-MCNC: 229 MG/DL
CO2 SERPL-SCNC: 32 MMOL/L (ref 20–32)
CREAT SERPL-MCNC: 0.79 MG/DL (ref 0.52–1.04)
ERYTHROCYTE [DISTWIDTH] IN BLOOD BY AUTOMATED COUNT: 13.6 % (ref 10–15)
GFR SERPL CREATININE-BSD FRML MDRD: 71 ML/MIN/1.7M2
GLUCOSE SERPL-MCNC: 90 MG/DL (ref 70–99)
HCT VFR BLD AUTO: 37 % (ref 35–47)
HDLC SERPL-MCNC: 128 MG/DL
HGB BLD-MCNC: 12.4 G/DL (ref 11.7–15.7)
LDLC SERPL CALC-MCNC: 91 MG/DL
MCH RBC QN AUTO: 31.1 PG (ref 26.5–33)
MCHC RBC AUTO-ENTMCNC: 33.5 G/DL (ref 31.5–36.5)
MCV RBC AUTO: 93 FL (ref 78–100)
NONHDLC SERPL-MCNC: 101 MG/DL
PLATELET # BLD AUTO: 227 10E9/L (ref 150–450)
POTASSIUM SERPL-SCNC: 3.3 MMOL/L (ref 3.4–5.3)
PROT SERPL-MCNC: 6.7 G/DL (ref 6.8–8.8)
RBC # BLD AUTO: 3.99 10E12/L (ref 3.8–5.2)
SODIUM SERPL-SCNC: 136 MMOL/L (ref 133–144)
TRIGL SERPL-MCNC: 48 MG/DL
TSH SERPL DL<=0.005 MIU/L-ACNC: 1.65 MU/L (ref 0.4–4)
WBC # BLD AUTO: 4.8 10E9/L (ref 4–11)

## 2017-08-11 PROCEDURE — 84443 ASSAY THYROID STIM HORMONE: CPT | Performed by: INTERNAL MEDICINE

## 2017-08-11 PROCEDURE — 80053 COMPREHEN METABOLIC PANEL: CPT | Performed by: INTERNAL MEDICINE

## 2017-08-11 PROCEDURE — 85027 COMPLETE CBC AUTOMATED: CPT | Performed by: INTERNAL MEDICINE

## 2017-08-11 PROCEDURE — 80061 LIPID PANEL: CPT | Performed by: INTERNAL MEDICINE

## 2017-08-11 PROCEDURE — 36415 COLL VENOUS BLD VENIPUNCTURE: CPT | Performed by: INTERNAL MEDICINE

## 2017-08-15 ENCOUNTER — HOSPITAL ENCOUNTER (OUTPATIENT)
Facility: CLINIC | Age: 77
Discharge: HOME | End: 2017-08-15
Attending: COLON & RECTAL SURGERY | Admitting: COLON & RECTAL SURGERY
Payer: MEDICARE

## 2017-08-15 ENCOUNTER — TRANSFERRED RECORDS (OUTPATIENT)
Dept: HEALTH INFORMATION MANAGEMENT | Facility: CLINIC | Age: 77
End: 2017-08-15

## 2017-08-15 VITALS
SYSTOLIC BLOOD PRESSURE: 122 MMHG | BODY MASS INDEX: 21.79 KG/M2 | DIASTOLIC BLOOD PRESSURE: 74 MMHG | HEIGHT: 63 IN | OXYGEN SATURATION: 97 % | WEIGHT: 123 LBS | RESPIRATION RATE: 16 BRPM

## 2017-08-15 LAB — COLONOSCOPY: NORMAL

## 2017-08-15 PROCEDURE — 88305 TISSUE EXAM BY PATHOLOGIST: CPT | Mod: 26 | Performed by: COLON & RECTAL SURGERY

## 2017-08-15 PROCEDURE — 45380 COLONOSCOPY AND BIOPSY: CPT | Performed by: COLON & RECTAL SURGERY

## 2017-08-15 PROCEDURE — 88305 TISSUE EXAM BY PATHOLOGIST: CPT | Performed by: COLON & RECTAL SURGERY

## 2017-08-15 PROCEDURE — 25000128 H RX IP 250 OP 636: Performed by: COLON & RECTAL SURGERY

## 2017-08-15 PROCEDURE — G0500 MOD SEDAT ENDO SERVICE >5YRS: HCPCS | Performed by: COLON & RECTAL SURGERY

## 2017-08-15 RX ORDER — ONDANSETRON 4 MG/1
4 TABLET, ORALLY DISINTEGRATING ORAL EVERY 6 HOURS PRN
Status: DISCONTINUED | OUTPATIENT
Start: 2017-08-15 | End: 2017-08-15 | Stop reason: HOSPADM

## 2017-08-15 RX ORDER — NALOXONE HYDROCHLORIDE 0.4 MG/ML
.1-.4 INJECTION, SOLUTION INTRAMUSCULAR; INTRAVENOUS; SUBCUTANEOUS
Status: DISCONTINUED | OUTPATIENT
Start: 2017-08-15 | End: 2017-08-15 | Stop reason: HOSPADM

## 2017-08-15 RX ORDER — ONDANSETRON 2 MG/ML
4 INJECTION INTRAMUSCULAR; INTRAVENOUS EVERY 6 HOURS PRN
Status: DISCONTINUED | OUTPATIENT
Start: 2017-08-15 | End: 2017-08-15 | Stop reason: HOSPADM

## 2017-08-15 RX ORDER — LIDOCAINE 40 MG/G
CREAM TOPICAL
Status: DISCONTINUED | OUTPATIENT
Start: 2017-08-15 | End: 2017-08-15 | Stop reason: HOSPADM

## 2017-08-15 RX ORDER — FLUMAZENIL 0.1 MG/ML
0.2 INJECTION, SOLUTION INTRAVENOUS
Status: DISCONTINUED | OUTPATIENT
Start: 2017-08-15 | End: 2017-08-15 | Stop reason: HOSPADM

## 2017-08-15 RX ORDER — FENTANYL CITRATE 50 UG/ML
INJECTION, SOLUTION INTRAMUSCULAR; INTRAVENOUS PRN
Status: DISCONTINUED | OUTPATIENT
Start: 2017-08-15 | End: 2017-08-15 | Stop reason: HOSPADM

## 2017-08-15 RX ORDER — ONDANSETRON 2 MG/ML
4 INJECTION INTRAMUSCULAR; INTRAVENOUS
Status: DISCONTINUED | OUTPATIENT
Start: 2017-08-15 | End: 2017-08-15 | Stop reason: HOSPADM

## 2017-08-15 NOTE — OP NOTE
See Provation Note In Chart    Leonarda Gaona MD  [facility]  Office Phone # [phone]

## 2017-08-15 NOTE — DISCHARGE INSTRUCTIONS
Understanding Colon and Rectal Polyps     The colon has a smooth lining composed of millions of cells.     The colon (also called the large intestine) is a muscular tube that forms the last part of the digestive tract. It absorbs water and stores food waste. The colon is about 4 to 6 feet long. The rectum is the last 6 inches of the colon. The colon and rectum have a smooth lining composed of millions of cells. Changes in these cells can lead to growths in the colon that can become cancerous and should be removed.     When the Colon Lining Changes  Changes that occur in the cells that line the colon or rectum can lead to growths called polyps. Over a period of years, polyps can turn cancerous. Removing polyps early may prevent cancer from ever forming.      Polyps  Polyps are fleshy clumps of tissue that form on the lining of the colon or rectum. Small polyps are usually benign (not cancerous). However, over time, cells in a polyp can change and become cancerous. The larger a polyp grows, the more likely this is to happen. Also, certain types of polyps known as adenomatous polyps are considered premalignant. This means that they will almost always become cancerous if they re not removed.          Cancer  Almost all colorectal cancers start when polyp cells begin growing abnormally. As a cancerous tumor grows, it may involve more and more of the colon or rectum. In time, cancer can also grow beyond the colon or rectum and spread to nearby organs or to glands called lymph nodes. The cells can also travel to other parts of the body. This is known as metastasis. The earlier a cancerous tumor is removed, the better the chance of preventing its spread.        [id number] [facility], [address] [place] [address], [place] [address]. All rights reserved. This information is not intended as a substitute for professional medical care. Always follow your healthcare professional's instructions.      Understanding Diverticulosis  and Diverticulitis     Pouches or diverticula usually occur in the lower part of the colon called the sigmoid.      Diverticulitis occurs when the pouches become inflamed.     The colon (large intestine) is the last part of the digestive tract. It absorbs water from stool and changes it from a liquid to a solid. In certain cases, small pouches called diverticula can form in the colon wall. This condition is called diverticulosis. The pouches can become infected. If this happens, it becomes a more serious problem called diverticulitis. These problems can be painful. But they can be managed.   Managing Your Condition  Diet changes or taking medications are often tried first. These may be enough to bring relief. If the case is bad, surgery may be done. You and your doctor can discuss the plan that is best for you.  If You Have Diverticulosis  Diet changes are often enough to control symptoms. The main changes are adding fiber (roughage) and drinking more water. Fiber absorbs water as it travels through your colon. This helps your stool stay soft and move smoothly. Water helps this process. If needed, you may be told to take over-the-counter stool softeners. To help relieve pain, antispasmodic medications may be prescribed.  If You Have Diverticulitis  Treatment depends on how bad your symptoms are.  For mild symptoms: You may be put on a liquid diet for a short time. You may also be prescribed antibiotics. If these two steps relieve your symptoms, you may then be prescribed a high-fiber diet. If you still have symptoms, your doctor will discuss further treatment options with you.  For severe symptoms: You may need to be admitted to the hospital. There, you can be given IV antibiotics and fluids. Once symptoms are under control, the above treatments may be tried. If these don t control your condition, your doctor may discuss the option of having surgery with you.  [place] to Colon Health  Help keep your colon healthy with  a diet that includes plenty of high-fiber fruits, vegetables, and whole grains. Drink plenty of liquids like water and juice. Your doctor may also recommend avoiding seeds and nuts.          [id number] [employer], [address] [place] [address], [place] [address]. All rights reserved. This information is not intended as a substitute for professional medical care. Always follow your healthcare professional's instructions.        HIGH FIBER DIET  Fiber is present in all fruits, vegetables, cereals and grains. Fiber passes through the body undigested. A high fiber diet helps food move through the intestinal tract. The added bulk is helpful in preventing constipation. In people with diverticulosis it serves to clean out the pouches along the colon wall while preventing new ones from forming. A high fiber diet also reduces the risk of colon cancer, decreases blood cholesterol and prevents high blood sugar in people with diabetes.    The foods listed below are high in fiber and should be included in your diet. If you are not used to high fiber foods, start with 1 or 2 foods from this list. Every 3-4 days add a new one to your diet until you are eating 4 high fiber foods per day. This should give you 20-35 Gm of fiber/day. It is also important to drink a lot of water when you are on this diet (6-8 glasses a day). Water causes the fiber to swell and increases the benefit.    FOODS HIGH IN DIETARY FIBER:  BREADS: Made with 100% whole wheat flour; rosalva, wheat or rye crackers; tortillas, bran muffins  CEREALS: Whole grain cereal with bran (Chex, Raisin Bran, Corn Bran), oatmeal, rolled oats, granola, wheat flakes, brown rice  NUTS: Any nuts  FRUITS: All fresh fruits along with edible skins, (bananas, citrus fruit, mangoes, pears, prunes, raisins, apples, pineapple, apricot, melon, jams and marmalades), fruit juices (especially prune juice)  VEGETABLES: All types, preferably raw or lightly cooked: especially, celery, eggplant,  potatoes, spinach, broccoli, brussel sprouts, winter squash, carrots, cauliflower, soybeans, lentils, fresh and dried beans of all kinds  OTHER: Popcorn, any spices      [id number] [employer] [address] [address]. All rights reserved. This information is not intended as a substitute for professional medical care. Always follow your healthcare professional's instructions.

## 2017-08-15 NOTE — IP AVS SNAPSHOT
MRN:[id number]                      After Visit Summary   8/15/2017    Mariluz Echeverria    MRN: [id number]           Thank you!     Thank you for choosing [facility] for your care. Our goal is always to provide you with excellent care. Hearing back from our patients is one way we can continue to improve our services. Please take a few minutes to complete the written survey that you may receive in the mail after you visit. If you would like to speak to someone directly about your visit please contact Patient Relations at [phone]. Thank you!          Patient Information     Date Of Birth          1940        About your hospital stay     You were admitted on:  August 15, 2017 You last received care in the:  [facility]    You were discharged on:  August 15, 2017       Who to Call     For medical emergencies, please call 911.  For non-urgent questions about your medical care, please call your primary care provider or clinic, [phone]  For questions related to your surgery, please call your surgery clinic        Attending Provider     Provider Specialty    Leonarda Gaona MD Colon and Rectal Surgery       Primary Care Provider Office Phone # Fax #    Yohana Falk [phone]      Further instructions from your care team         Understanding Colon and Rectal Polyps     The colon has a smooth lining composed of millions of cells.     The colon (also called the large intestine) is a muscular tube that forms the last part of the digestive tract. It absorbs water and stores food waste. The colon is about 4 to 6 feet long. The rectum is the last 6 inches of the colon. The colon and rectum have a smooth lining composed of millions of cells. Changes in these cells can lead to growths in the colon that can become cancerous and should be removed.     When the Colon Lining Changes  Changes that occur in the cells that line the colon or rectum  can lead to growths called polyps. Over a period of years, polyps can turn cancerous. Removing polyps early may prevent cancer from ever forming.      Polyps  Polyps are fleshy clumps of tissue that form on the lining of the colon or rectum. Small polyps are usually benign (not cancerous). However, over time, cells in a polyp can change and become cancerous. The larger a polyp grows, the more likely this is to happen. Also, certain types of polyps known as adenomatous polyps are considered premalignant. This means that they will almost always become cancerous if they re not removed.          Cancer  Almost all colorectal cancers start when polyp cells begin growing abnormally. As a cancerous tumor grows, it may involve more and more of the colon or rectum. In time, cancer can also grow beyond the colon or rectum and spread to nearby organs or to glands called lymph nodes. The cells can also travel to other parts of the body. This is known as metastasis. The earlier a cancerous tumor is removed, the better the chance of preventing its spread.        0129-3146 [place] [address]. All rights reserved. This information is not intended as a substitute for professional medical care. Always follow your healthcare professional's instructions.      Understanding Diverticulosis and Diverticulitis     Pouches or diverticula usually occur in the lower part of the colon called the sigmoid.      Diverticulitis occurs when the pouches become inflamed.     The colon (large intestine) is the last part of the digestive tract. It absorbs water from stool and changes it from a liquid to a solid. In certain cases, small pouches called diverticula can form in the colon wall. This condition is called diverticulosis. The pouches can become infected. If this happens, it becomes a more serious problem called diverticulitis. These problems can be painful. But they can be managed.   Managing Your  Condition  Diet changes or taking medications are often tried first. These may be enough to bring relief. If the case is bad, surgery may be done. You and your doctor can discuss the plan that is best for you.  If You Have Diverticulosis  Diet changes are often enough to control symptoms. The main changes are adding fiber (roughage) and drinking more water. Fiber absorbs water as it travels through your colon. This helps your stool stay soft and move smoothly. Water helps this process. If needed, you may be told to take over-the-counter stool softeners. To help relieve pain, antispasmodic medications may be prescribed.  If You Have Diverticulitis  Treatment depends on how bad your symptoms are.  For mild symptoms: You may be put on a liquid diet for a short time. You may also be prescribed antibiotics. If these two steps relieve your symptoms, you may then be prescribed a high-fiber diet. If you still have symptoms, your doctor will discuss further treatment options with you.  For severe symptoms: You may need to be admitted to the hospital. There, you can be given IV antibiotics and fluids. Once symptoms are under control, the above treatments may be tried. If these don t control your condition, your doctor may discuss the option of having surgery with you.  [place] to Colon Health  Help keep your colon healthy with a diet that includes plenty of high-fiber fruits, vegetables, and whole grains. Drink plenty of liquids like water and juice. Your doctor may also recommend avoiding seeds and nuts.          [id number] [facility], [address] [place] [address]. All rights reserved. This information is not intended as a substitute for professional medical care. Always follow your healthcare professional's instructions.        HIGH FIBER DIET  Fiber is present in all fruits, vegetables, cereals and grains. Fiber passes through the body undigested. A high fiber diet helps food move through the intestinal  tract. The added bulk is helpful in preventing constipation. In people with diverticulosis it serves to clean out the pouches along the colon wall while preventing new ones from forming. A high fiber diet also reduces the risk of colon cancer, decreases blood cholesterol and prevents high blood sugar in people with diabetes.    The foods listed below are high in fiber and should be included in your diet. If you are not used to high fiber foods, start with 1 or 2 foods from this list. Every 3-4 days add a new one to your diet until you are eating 4 high fiber foods per day. This should give you 20-35 Gm of fiber/day. It is also important to drink a lot of water when you are on this diet (6-8 glasses a day). Water causes the fiber to swell and increases the benefit.    FOODS HIGH IN DIETARY FIBER:  BREADS: Made with 100% whole wheat flour; rosalva, wheat or rye crackers; tortillas, bran muffins  CEREALS: Whole grain cereal with bran (Chex, Raisin Bran, Corn Bran), oatmeal, rolled oats, granola, wheat flakes, brown rice  NUTS: Any nuts  FRUITS: All fresh fruits along with edible skins, (bananas, citrus fruit, mangoes, pears, prunes, raisins, apples, pineapple, apricot, melon, jams and marmalades), fruit juices (especially prune juice)  VEGETABLES: All types, preferably raw or lightly cooked: especially, celery, eggplant, potatoes, spinach, broccoli, brussel sprouts, winter squash, carrots, cauliflower, soybeans, lentils, fresh and dried beans of all kinds  OTHER: Popcorn, any spices      6756-9992 [address], [place] [address]. All rights reserved. This information is not intended as a substitute for professional medical care. Always follow your healthcare professional's instructions.    Pending Results     No orders found from 8/13/2017 to 8/16/2017.            Admission Information     Date & Time Provider Department Dept. Phone    8/15/2017 Leonarda Gaona MD [facility]  "Endoscopy [phone]      Your Vitals Were     Blood Pressure Respirations Height Weight Pulse Oximetry BMI (Body Mass Index)    88/59 14 1.6 m (5' 3\") 55.8 kg (123 lb) 97% 21.79 kg/m2      MyChart Information     [employer] lets you send messages to your doctor, view your test results, renew your prescriptions, schedule appointments and more. To sign up, go to [email or web address] . Click on \"Log in\" on the left side of the screen, which will take you to the Welcome page. Then click on \"Sign up Now\" on the right side of the page.     You will be asked to enter the access code listed below, as well as some personal information. Please follow the directions to create your username and password.     Your access code is: [email or web address]  Expires: 2017 10:28 AM     Your access code will  in 90 days. If you need help or a new code, please call your [facility] clinic or [phone].        Care EveryWhere ID     This is your Care EveryWhere ID. This could be used by other organizations to access your [facility] medical records  [id number]        Equal Access to Services     LEILANI HAYNES AH: Arnaldo Loredo, waki schroeder, qahernanta kaalmada adenu, jayjay davila. So [facility] [phone].    ATENCIÓN: Si habla español, tiene a cuba disposición servicios gratuitos de asistencia lingüística. Llame al [phone].    We comply with applicable federal civil rights laws and [place] laws. We do not discriminate on the basis of race, color, national origin, age, disability sex, sexual orientation or gender identity.               Review of your medicines      CONTINUE these medicines which have NOT CHANGED        Dose / Directions    amLODIPine 5 MG tablet   Commonly known as:  NORVASC   Used for:  Benign essential hypertension        Take one 5 MG pill by mouth in the morning   Quantity:  90 tablet   Refills:  3       aspirin 81 MG tablet        Take by mouth daily   Refills:  0    "    CALCIUM 600 + D PO        Dose:  1 tablet   Take 1 tablet by mouth daily   Refills:  0       flecainide 50 MG tablet   Commonly known as:  TAMBOCOR   Used for:  Paroxysmal atrial fibrillation (H)        Dose:  50 mg   Take 1 tablet (50 mg) by mouth 2 times daily   Quantity:  180 tablet   Refills:  3       lisinopril-hydrochlorothiazide 20-12.5 MG per tablet   Commonly known as:  PRINZIDE/ZESTORETIC   Used for:  Essential hypertension, benign        Dose:  2 tablet   Take 2 tablets by mouth daily   Quantity:  180 tablet   Refills:  3       METAMUCIL FIBER PO        Take by mouth daily   Refills:  0       metoprolol 50 MG 24 hr tablet   Commonly known as:  TOPROL-XL   Used for:  Essential hypertension, benign        Dose:  50 mg   Take 1 tablet (50 mg) by mouth daily   Quantity:  90 tablet   Refills:  2       PRESERVISION/LUTEIN PO        Dose:  2 capsule   Take 2 capsules by mouth daily   Refills:  0                Protect others around you: Learn how to safely use, store and throw away your medicines at www.disposemymeds.org.             Medication List: This is a list of all your medications and when to take them. Check marks below indicate your daily home schedule. Keep this list as a reference.      Medications           Morning Afternoon Evening Bedtime As Needed    amLODIPine 5 MG tablet   Commonly known as:  NORVASC   Take one 5 MG pill by mouth in the morning                                aspirin 81 MG tablet   Take by mouth daily                                CALCIUM 600 + D PO   Take 1 tablet by mouth daily                                flecainide 50 MG tablet   Commonly known as:  TAMBOCOR   Take 1 tablet (50 mg) by mouth 2 times daily                                lisinopril-hydrochlorothiazide 20-12.5 MG per tablet   Commonly known as:  PRINZIDE/ZESTORETIC   Take 2 tablets by mouth daily                                METAMUCIL FIBER PO   Take by mouth daily                                 metoprolol 50 MG 24 hr tablet   Commonly known as:  TOPROL-XL   Take 1 tablet (50 mg) by mouth daily                                PRESERVISION/LUTEIN PO   Take 2 capsules by mouth daily

## 2017-08-16 LAB — COPATH REPORT: NORMAL

## 2017-09-15 DIAGNOSIS — Z00.00 ROUTINE GENERAL MEDICAL EXAMINATION AT A HEALTH CARE FACILITY: ICD-10-CM

## 2017-09-15 PROCEDURE — 84132 ASSAY OF SERUM POTASSIUM: CPT | Performed by: INTERNAL MEDICINE

## 2017-09-15 PROCEDURE — 36415 COLL VENOUS BLD VENIPUNCTURE: CPT | Performed by: INTERNAL MEDICINE

## 2017-09-16 LAB — POTASSIUM SERPL-SCNC: 3.7 MMOL/L (ref 3.4–5.3)

## 2017-11-10 RX ORDER — LISINOPRIL/HYDROCHLOROTHIAZIDE 10-12.5 MG
TABLET ORAL
Qty: 180 TABLET | Refills: 3 | OUTPATIENT
Start: 2017-11-10

## 2017-11-14 DIAGNOSIS — I48.0 PAROXYSMAL ATRIAL FIBRILLATION (H): ICD-10-CM

## 2017-11-14 DIAGNOSIS — I10 ESSENTIAL HYPERTENSION, BENIGN: ICD-10-CM

## 2017-11-14 RX ORDER — METOPROLOL SUCCINATE 50 MG/1
50 TABLET, EXTENDED RELEASE ORAL DAILY
Qty: 90 TABLET | Refills: 2 | Status: SHIPPED | OUTPATIENT
Start: 2017-11-14 | End: 2018-06-06

## 2017-11-14 RX ORDER — FLECAINIDE ACETATE 50 MG/1
50 TABLET ORAL 2 TIMES DAILY
Qty: 180 TABLET | Refills: 0 | Status: SHIPPED | OUTPATIENT
Start: 2017-11-14 | End: 2018-02-12

## 2017-11-15 NOTE — TELEPHONE ENCOUNTER
Metoprolol  Prescription approved per [facility] Refill Protocol.    Marielacobrittany  LFT, CBC, CR WNL done 8/11/17.  Prescription approved per [facility] Refill Protocol.

## 2018-02-12 DIAGNOSIS — I48.0 PAROXYSMAL ATRIAL FIBRILLATION (H): ICD-10-CM

## 2018-02-16 RX ORDER — FLECAINIDE ACETATE 50 MG/1
50 TABLET ORAL 2 TIMES DAILY
Qty: 180 TABLET | Refills: 1 | Status: SHIPPED | OUTPATIENT
Start: 2018-02-16 | End: 2018-05-18

## 2018-02-16 NOTE — TELEPHONE ENCOUNTER
"Requested Prescriptions   Pending Prescriptions Disp Refills     flecainide (TAMBOCOR) 50 MG tablet 180 tablet 0     Sig: Take 1 tablet (50 mg) by mouth 2 times daily    Anti Arrhythmic Agents Protocol Failed    2/12/2018  9:04 AM       Failed - Medication needs approval from authorizing provider    Please forward this request to the authorizing provider for approval.          Failed - Recent or future visit with authorizing provider's specialty    Patient had office visit in the last 6 months or has a visit in the next 30 days with authorizing provider.  See \"Patient Info\" tab in inbasket, or \"Choose Columns\" in Meds & Orders section of the refill encounter.           Passed - Lipid Panel on file in past year    Recent Labs   Lab Test  08/11/17   0911   05/13/14   0847   CHOL  229*   < >  215*   TRIG  48   < >  25   HDL  128   < >  95   LDL  91   < >  115   NHDL  101   < >   --    VLDL   --    --   5   CHOLHDLRATIO   --    --   2.3    < > = values in this interval not displayed.              Passed - CBC on file in past year    Recent Labs   Lab Test  08/11/17   0911   WBC  4.8   RBC  3.99   HGB  12.4   HCT  37.0   PLT  227            Passed - ALT on file in past year    Recent Labs   Lab Test  08/11/17   0911   ALT  21            Passed - Serum Creatinine on file in past year    Recent Labs   Lab Test  08/11/17   0911   CR  0.79            Passed - Serum Sodium on file in past year    Recent Labs   Lab Test  08/11/17   0911   NA  136             Passed - Serum Potassium on file in past year    Recent Labs   Lab Test  09/15/17   0937   POTASSIUM  3.7            Passed - Patient is 18 years of age or older       Passed - Patient is not pregnant       Passed - No positive pregnancy test on file in past year          "

## 2018-03-09 ENCOUNTER — OFFICE VISIT (OUTPATIENT)
Dept: INTERNAL MEDICINE | Facility: CLINIC | Age: 78
End: 2018-03-09
Payer: COMMERCIAL

## 2018-03-09 VITALS
TEMPERATURE: 98.6 F | WEIGHT: 126.3 LBS | BODY MASS INDEX: 22.38 KG/M2 | DIASTOLIC BLOOD PRESSURE: 70 MMHG | SYSTOLIC BLOOD PRESSURE: 116 MMHG | HEART RATE: 62 BPM | HEIGHT: 63 IN | OXYGEN SATURATION: 100 %

## 2018-03-09 DIAGNOSIS — I10 ESSENTIAL HYPERTENSION, BENIGN: ICD-10-CM

## 2018-03-09 DIAGNOSIS — M54.41 RIGHT-SIDED LOW BACK PAIN WITH RIGHT-SIDED SCIATICA, UNSPECIFIED CHRONICITY: Primary | ICD-10-CM

## 2018-03-09 DIAGNOSIS — M25.571 RIGHT ANKLE PAIN, UNSPECIFIED CHRONICITY: ICD-10-CM

## 2018-03-09 PROCEDURE — 99214 OFFICE O/P EST MOD 30 MIN: CPT | Performed by: INTERNAL MEDICINE

## 2018-03-09 NOTE — MR AVS SNAPSHOT
After Visit Summary   3/9/2018    Mariluz Echeverria    MRN: [id number]           Patient Information     Date Of Birth          1940        Visit Information        Provider Department      3/9/2018 10:20 AM Oh Rosas MD [facility]        Today's Diagnoses     Right-sided low back pain with right-sided sciatica, unspecified chronicity    -  1       Follow-ups after your visit        Additional Services     [facility] PT, HAND, AND CHIROPRACTIC REFERRAL       **This order will print in the [facility] Scheduling Office**    Physical Therapy, Hand Therapy and Chiropractic Care are available through:    *[facility]  *[facility]  *[facility]    Call one number to schedule at any of the above locations: [phone].    Your provider has referred you to: Physical Therapy at [facility] or [facility]    Indication/Reason for Referral: Low Back Pain  Onset of Illness: 6 months   Therapy Orders: Evaluate and Treat  Special Programs: None  Special Request: None    Miky Bolaños      Additional Comments for the Therapist or Chiropractor: lumbar scoliosis     Please be aware that coverage of these services is subject to the terms and limitations of your health insurance plan.  Call member services at your health plan with any benefit or coverage questions.      Please bring the following to your appointment:    *Your personal calendar for scheduling future appointments  *Comfortable clothing                  Future tests that were ordered for you today     Open Future Orders        Priority Expected Expires Ordered    MR Lumbar Spine w/o Contrast Routine  3/9/2019 3/9/2018            Who to contact     If you have questions or need follow up information about today's clinic visit or your schedule please contact [facility] directly at [phone].  Normal or non-critical lab and imaging results will be communicated to you by [facility]  "letter or phone within 4 business days after the clinic has received the results. If you do not hear from us within 7 days, please contact the clinic through [employer] or phone. If you have a critical or abnormal lab result, we will notify you by phone as soon as possible.  Submit refill requests through [employer] or call your pharmacy and they will forward the refill request to us. Please allow 3 business days for your refill to be completed.          Additional Information About Your Visit        [employer] Information     [employer] lets you send messages to your doctor, view your test results, renew your prescriptions, schedule appointments and more. To sign up, go to [email or web address] . Click on \"Log in\" on the left side of the screen, which will take you to the Welcome page. Then click on \"Sign up Now\" on the right side of the page.     You will be asked to enter the access code listed below, as well as some personal information. Please follow the directions to create your username and password.     Your access code is: [email or web address]  Expires: 2018 11:05 AM     Your access code will  in 90 days. If you need help or a new code, please call your [facility] clinic or [phone].        Care EveryWhere ID     This is your Care EveryWhere ID. This could be used by other organizations to access your [facility] medical records  [id number]        Your Vitals Were     Pulse Temperature Height Pulse Oximetry BMI (Body Mass Index)       62 98.6  F (37  C) (Oral) 5' 3\" (1.6 m) 100% 22.37 kg/m2        Blood Pressure from Last 3 Encounters:   18 116/70   08/15/17 122/74   17 118/76    Weight from Last 3 Encounters:   18 126 lb 4.8 oz (57.3 kg)   08/15/17 123 lb (55.8 kg)   17 122 lb 11.2 oz (55.7 kg)              We Performed the Following     [facility]IN PT, HAND, AND CHIROPRACTIC REFERRAL        Primary Care Provider Office Phone # Fax #    Yohana Falk [phone]       [address] [facility] [address]  [facility] [address]        Equal Access to Services     LEILANI HAYNES : Hadii elvira ku hadhadleyo Sojayantali, waaxda luqadaha, qaybta kaalmada yvonneagustíncarson, waxay idiin haymichaeloliver estrelladennisnathaniel davila. So [facility] [phone].    ATENCIÓN: Si habla español, tiene a cuba disposición servicios gratuitos de asistencia lingüística. Llame al [phone].    We comply with applicable federal civil rights laws and [place] laws. We do not discriminate on the basis of race, color, national origin, age, disability, sex, sexual orientation, or gender identity.            Thank you!     Thank you for choosing [facility]  for your care. Our goal is always to provide you with excellent care. Hearing back from our patients is one way we can continue to improve our services. Please take a few minutes to complete the written survey that you may receive in the mail after your visit with us. Thank you!             Your Updated Medication List - Protect others around you: Learn how to safely use, store and throw away your medicines at www.disposemymeds.org.          This list is accurate as of 3/9/18 11:06 AM.  Always use your most recent med list.                   Brand Name Dispense Instructions for use Diagnosis    amLODIPine 5 MG tablet    NORVASC    90 tablet    Take one 5 MG pill by mouth in the morning    Benign essential hypertension       aspirin 81 MG tablet      Take by mouth daily        CALCIUM 600 + D PO      Take 1 tablet by mouth daily        flecainide 50 MG tablet    TAMBOCOR    180 tablet    Take 1 tablet (50 mg) by mouth 2 times daily    Paroxysmal atrial fibrillation (H)       lisinopril-hydrochlorothiazide 20-12.5 MG per tablet    PRINZIDE/ZESTORETIC    180 tablet    Take 2 tablets by mouth daily    Essential hypertension, benign       METAMUCIL FIBER PO      Take by mouth daily        metoprolol succinate 50 MG 24 hr tablet    TOPROL-XL    90 tablet    Take 1 tablet (50 mg) by mouth  daily    Essential hypertension, benign       PRESERVISION/LUTEIN PO      Take 2 capsules by mouth daily

## 2018-03-09 NOTE — PROGRESS NOTES
SUBJECTIVE:   Mariluz Echeverria is a 77 year old female who presents to clinic today for the following health issues:      Patient presents with low back pain for 6 months. Located in the lower back , right paravertebral area. Does not radiate to the legs. No  neurologic symptoms- numbness, tingling, weakness. No  trauma. Medications tried: Ibuprofen, with good result. Prior history of LBP no.affects ambulation. Worse with walking. More than 15 minutes develops pain. No weakness, numbness.   Has had right ankle pain after twisting it on steps. Improved but had symptoms for 6 years. No edema.   Has h/o HTN. on medical treatment. BP has been controlled. No side effects from medications. No CP, HA, dizziness. good compliance with medications and low salt diet.  Has history of atrial fibrillation. On rhythm control medications. Asymptonatic - no chest pains , palpitations,  no side effects from medications. Has had ablation.         Problem list and histories reviewed & adjusted, as indicated.  Additional history: as documented    Patient Active Problem List   Diagnosis     Arthropathy of pelvic region and thigh     Diffuse cystic mastopathy     Urethral stricture     Essential hypertension, benign     Postmenopausal atrophic vaginitis     CARDIOVASCULAR SCREENING; LDL GOAL LESS THAN 130     Advanced directives, counseling/discussion     Urinary retention     First degree AV block     S/P AV conner ablation     Paroxysmal atrial fibrillation (H)     Past Surgical History:   Procedure Laterality Date     C NONSPECIFIC PROCEDURE      numerous breast aspirations     C NONSPECIFIC PROCEDURE      T&A     C NONSPECIFIC PROCEDURE      left breast bx     C NONSPECIFIC PROCEDURE      numerous breast aspirations     C NONSPECIFIC PROCEDURE      tubal lig (remote)     COLONOSCOPY N/A 8/15/2017    Procedure: COMBINED COLONOSCOPY, SINGLE OR MULTIPLE BIOPSY/POLYPECTOMY BY BIOPSY;  COLONOSCOPY WITH POLYPECTOMY USING COLD FORCEPS;   Surgeon: Leonarda Gaona MD;  Location:  GI     s/p AV conner ablation      At [facility], remote history      wisdom teeth         Social History   Substance Use Topics     Smoking status: Former Smoker     Smokeless tobacco: Never Used     Alcohol use 0.0 oz/week     0 Standard drinks or equivalent per week      Comment: 1 glass wine per day     Family History   Problem Relation Age of Onset     Arthritis Mother      Neurologic Disorder Mother      parkinsons     Hypertension Mother      Hypertension Father      CEREBROVASCULAR DISEASE Father      2     HEART DISEASE Father      mi x 2     GASTROINTESTINAL DISEASE Father      bleeding ulcers     CANCER Father      prostate     Hypertension Brother      Breast Cancer Maternal Grandmother          Current Outpatient Prescriptions   Medication Sig Dispense Refill     flecainide (TAMBOCOR) 50 MG tablet Take 1 tablet (50 mg) by mouth 2 times daily 180 tablet 1     metoprolol (TOPROL-XL) 50 MG 24 hr tablet Take 1 tablet (50 mg) by mouth daily 90 tablet 2     amLODIPine (NORVASC) 5 MG tablet Take one 5 MG pill by mouth in the morning 90 tablet 3     aspirin 81 MG tablet Take by mouth daily       Psyllium (METAMUCIL FIBER PO) Take by mouth daily       lisinopril-hydrochlorothiazide (PRINZIDE/ZESTORETIC) 20-12.5 MG per tablet Take 2 tablets by mouth daily 180 tablet 3     Multiple Vitamins-Minerals (PRESERVISION/LUTEIN PO) Take 2 capsules by mouth daily       Calcium Carbonate-Vitamin D (CALCIUM 600 + D OR) Take 1 tablet by mouth daily         Reviewed and updated as needed this visit by clinical staff  Tobacco  Allergies  Problems  Med Hx  Surg Hx  Fam Hx  Soc Hx      Reviewed and updated as needed this visit by Provider         ROS:  Constitutional, HEENT, cardiovascular, pulmonary, gi and gu systems are negative, except as otherwise noted.    OBJECTIVE:     /70 (BP Location: Left arm, Patient Position: Sitting, Cuff Size: Adult Regular)  Pulse 62   "Temp 98.6  F (37  C) (Oral)  Ht 5' 3\" (1.6 m)  Wt 126 lb 4.8 oz (57.3 kg)  SpO2 100%  BMI 22.37 kg/m2  Body mass index is 22.37 kg/(m^2).   GENERAL: healthy, alert and no distress  MS: no gross musculoskeletal defects noted, no edema  Right paravertebral LS spine tenderness at L3 level, left dextroskoliosis of the L spine   SKIN: no suspicious lesions or rashes        ASSESSMENT/PLAN:     Problem List Items Addressed This Visit     Essential hypertension, benign      Other Visit Diagnoses     Right-sided low back pain with right-sided sciatica, unspecified chronicity    -  Primary    Relevant Orders    MR Lumbar Spine w/o Contrast    [facility] REFERRAL    Right ankle pain, unspecified chronicity               Assess MRI of the LS spine  Refer to PT  PRN NSAID  Cont treatment     Follow-Up:with results     Oh Rosas MD  [facility]    "

## 2018-03-09 NOTE — NURSING NOTE
"Chief Complaint   Patient presents with     Musculoskeletal Problem     leg and back pain       Initial /70 (BP Location: Left arm, Patient Position: Sitting, Cuff Size: Adult Regular)  Pulse 62  Temp 98.6  F (37  C) (Oral)  Ht 5' 3\" (1.6 m)  Wt 126 lb 4.8 oz (57.3 kg)  SpO2 100%  BMI 22.37 kg/m2 Estimated body mass index is 22.37 kg/(m^2) as calculated from the following:    Height as of this encounter: 5' 3\" (1.6 m).    Weight as of this encounter: 126 lb 4.8 oz (57.3 kg).  Medication Reconciliation: complete    "

## 2018-03-14 ENCOUNTER — HOSPITAL ENCOUNTER (OUTPATIENT)
Dept: MRI IMAGING | Facility: CLINIC | Age: 78
Discharge: HOME | End: 2018-03-14
Attending: INTERNAL MEDICINE | Admitting: INTERNAL MEDICINE
Payer: MEDICARE

## 2018-03-14 DIAGNOSIS — M54.41 RIGHT-SIDED LOW BACK PAIN WITH RIGHT-SIDED SCIATICA, UNSPECIFIED CHRONICITY: ICD-10-CM

## 2018-03-14 DIAGNOSIS — M51.369 DDD (DEGENERATIVE DISC DISEASE), LUMBAR: Primary | ICD-10-CM

## 2018-03-14 PROCEDURE — 72148 MRI LUMBAR SPINE W/O DYE: CPT

## 2018-03-16 ENCOUNTER — TELEPHONE (OUTPATIENT)
Dept: INTERNAL MEDICINE | Facility: CLINIC | Age: 78
End: 2018-03-16

## 2018-03-16 NOTE — TELEPHONE ENCOUNTER
Patient states ortho did not have a referral, patient had different number than was on referral 3/9/18. Gave pt number [phone], To [facility] to schedule. The referral should be viewable with them. Advised to call back if they do not have it.

## 2018-03-16 NOTE — TELEPHONE ENCOUNTER
Pt calls, Dr. Rosas referred her to a orthopedic doctor for a mass found on MRI. Pt asks if they will contact her or if she needs to call them. Informed pt referral was placed to Ortho  so she should be contacted for this appt, provided their phone number to call if she does not hear from them today. Pt agrees with plan.

## 2018-03-19 ENCOUNTER — THERAPY VISIT (OUTPATIENT)
Dept: PHYSICAL THERAPY | Facility: CLINIC | Age: 78
End: 2018-03-19
Payer: MEDICARE

## 2018-03-19 DIAGNOSIS — M54.41 CHRONIC RIGHT-SIDED LOW BACK PAIN WITH RIGHT-SIDED SCIATICA: Primary | ICD-10-CM

## 2018-03-19 DIAGNOSIS — G89.29 CHRONIC RIGHT-SIDED LOW BACK PAIN WITH RIGHT-SIDED SCIATICA: Primary | ICD-10-CM

## 2018-03-19 PROCEDURE — 97161 PT EVAL LOW COMPLEX 20 MIN: CPT | Mod: GP | Performed by: PHYSICAL THERAPIST

## 2018-03-19 PROCEDURE — G8978 MOBILITY CURRENT STATUS: HCPCS | Mod: GP | Performed by: PHYSICAL THERAPIST

## 2018-03-19 PROCEDURE — G8979 MOBILITY GOAL STATUS: HCPCS | Mod: GP | Performed by: PHYSICAL THERAPIST

## 2018-03-19 PROCEDURE — 97110 THERAPEUTIC EXERCISES: CPT | Mod: GP | Performed by: PHYSICAL THERAPIST

## 2018-03-19 NOTE — LETTER
DEPARTMENT OF HEALTH AND HUMAN SERVICES  [facility] FOR [facility]ARE & MEDICAID SERVICES    PLAN/UPDATED PLAN OF PROGRESS FOR OUTPATIENT REHABILITATION    PATIENTS NAME:  Mariluz Echeverria   : 1940  PROVIDER NUMBER:    [id number]  [place]:  [id number]  PROVIDER NAME: [facility]  MEDICAL RECORD NUMBER: [id number]   START OF CARE DATE:  SOC Date: 18   TYPE:  PT  PRIMARY/TREATMENT DIAGNOSIS: (Pertinent Medical Diagnosis)  Chronic right-sided low back pain with right-sided sciatica    VISITS FROM START OF CARE:  Rxs Used: 1     [place] [facility] Initial Evaluation  Subjective:  Patient is a 77 year old female presenting with rehab back hpi.   Mariluz Echeverria is a 77 year old female with a lumbar condition.  Condition occurred with:  Insidious onset.    This is a chronic condition  History of right lumbar pain radiating into the right lower extremity for over 6 months. 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 Pt referred by MD for physical therapy on 3-9-18.   Patient reports pain:  Lumbar spine right.  Radiates to:  Gluteals right, thigh right, knee right, lower leg right and foot right.  Pain is described as cramping and sharp and is intermittent and reported as 8/10.  Associated symptoms:  Loss of strength and loss of motion/stiffness. Pain is the same all the time.  Symptoms are exacerbated by twisting, lifting, carrying and walking and relieved by rest and NSAID's.    Special tests:  MRI (see report).  Previous treatment includes physical therapy (2015 right lower extremity).  There was moderate improvement following previous treatment.    Pertinent medical history includes:  High blood pressure and osteoporosis.  Medical allergies: yes (demoral).    Current medications:  High blood pressure medication.  Current occupation is retired.    Barriers include:  None as reported by the patient.  Red flags: nerve sheath tumor.                  Objective:  Standing Alignment:    Lumbar deviations alignment: increased lumbar lordosis.  Flexibility/Screens:   Lower Extremity:  Decreased left lower extremity flexibility:Hip Flexors and IT Band  Decreased right lower extremity flexibility:  Hip Flexors and IT Band  Lumbar/SI Evaluation  ROM:    AROM Lumbar:   Flexion:          90%  Ext:                    60%   Side Bend:        Left:  50% pain     Right:  75%  Rotation:           Left:     Right:   Side Glide:        Left:     Right:   Strength: weak lower abdominals. bridging causes lumbar pain  Lumbar Myotomes:  normal  Lumbar DTR's:  normal  Lumbar Dermtomes:  normal  Neural Tension/Mobility:    Left side:SLR or SLR w/DF  negative.   Right side:   SLR w/DF or SLR  negative.   Lumbar Palpation:  Palpation (lumbar): point tenderness lower lumbar spine.  PATIENTS NAME:  Mariluz Echeverria   : 1940    Assessment/Plan:    Patient is a 77 year old female with lumbar complaints.    Patient has the following significant findings with corresponding treatment plan.                Diagnosis 1:  Right lumbar spine  Pain -  hot/cold therapy, self management, education, directional preference exercise and home program  Decreased ROM/flexibility - therapeutic exercise, therapeutic activity and home program  Decreased strength - therapeutic exercise, therapeutic activities and home program    Therapy Evaluation Codes:   1) History comprised of:   Personal factors that impact the plan of care:      Time since onset of symptoms.    Comorbidity factors that impact the plan of care are:      High blood pressure and Weakness.     Medications impacting care: Anti-inflammatory and High blood pressure.  2) Examination of Body Systems comprised of:   Body structures and functions that impact the plan of care:      Lumbar spine.   Activity limitations that impact the plan of care are:      Lifting, Standing and Walking.  3) Clinical presentation characteristics  "are:   Stable/Uncomplicated.  4) Decision-Making    Low complexity using standardized patient assessment instrument and/or measureable assessment of functional outcome.  Cumulative Therapy Evaluation is: Low complexity.    Previous and current functional limitations:  (See Goal Flow Sheet for this information)    Short term and Long term goals: (See Goal Flow Sheet for this information)     Communication ability:  Patient appears to be able to clearly communicate and understand verbal and written communication and follow directions correctly.  Treatment Explanation - The following has been discussed with the patient:   RX ordered/plan of care  Anticipated outcomes  Possible risks and side effects  This patient would benefit from PT intervention to resume normal activities.   Rehab potential is good.    Frequency:  1 X week, once daily  Duration:  for 6 weeks  Discharge Plan:  Achieve all LTG.  Independent in home treatment program.  Reach maximal therapeutic benefit.                        PATIENTS NAME:  Mariluz Echeverria   : 1940            Caregiver Signature/Credentials _____________________________ Date ________      Treating Provider: Michael Aden, PT   I have reviewed and certified the need for these services and plan of treatment while under my care.        PHYSICIAN'S SIGNATURE:   ____________________________________ Date___________    Oh Rosas MD    Certification period:  Beginning of Cert date period: 18 to  End of Cert period date: 18     Functional Level Progress Report: Please see attached \"Goal Flow sheet for Functional level.\"    ____X____ Continue Services or       ________ DC Services                Service dates: From  SOC Date: 18 date to present                         "

## 2018-03-19 NOTE — PROGRESS NOTES
[place] [facility] Initial Evaluation  Subjective:  Patient is a 77 year old female presenting with rehab back hpi.   Mariluz Echeverria is a 77 year old female with a lumbar condition.  Condition occurred with:  Insidious onset.    This is a chronic condition  History of right lumbar pain radiating into the right lower extremity for over 6 months. 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 Pt referred by MD for physical therapy on 3-9-18.    Patient reports pain:  Lumbar spine right.  Radiates to:  Gluteals right, thigh right, knee right, lower leg right and foot right.  Pain is described as cramping and sharp and is intermittent and reported as 8/10.  Associated symptoms:  Loss of strength and loss of motion/stiffness. Pain is the same all the time.  Symptoms are exacerbated by twisting, lifting, carrying and walking and relieved by rest and NSAID's.    Special tests:  MRI (see report).  Previous treatment includes physical therapy (2015 right lower extremity).  There was moderate improvement following previous treatment.    Pertinent medical history includes:  High blood pressure and osteoporosis.  Medical allergies: yes (demoral).    Current medications:  High blood pressure medication.  Current occupation is retired.        Barriers include:  None as reported by the patient.    Red flags: nerve sheath tumor.                        Objective:  Standing Alignment:        Lumbar deviations alignment: increased lumbar lordosis.                Flexibility/Screens:       Lower Extremity:  Decreased left lower extremity flexibility:Hip Flexors and IT Band    Decreased right lower extremity flexibility:  Hip Flexors and IT Band               Lumbar/SI Evaluation  ROM:    AROM Lumbar:   Flexion:          90%  Ext:                    60%   Side Bend:        Left:  50% pain     Right:  75%  Rotation:           Left:     Right:   Side Glide:         Left:     Right:         Strength: weak lower abdominals. bridging causes lumbar pain  Lumbar Myotomes:  normal            Lumbar DTR's:  normal        Lumbar Dermtomes:  normal                Neural Tension/Mobility:      Left side:SLR or SLR w/DF  negative.     Right side:   SLR w/DF or SLR  negative.   Lumbar Palpation:  Palpation (lumbar): point tenderness lower lumbar spine.                                                         General     ROS    Assessment/Plan:    Patient is a 77 year old female with lumbar complaints.    Patient has the following significant findings with corresponding treatment plan.                Diagnosis 1:  Right lumbar spine  Pain -  hot/cold therapy, self management, education, directional preference exercise and home program  Decreased ROM/flexibility - therapeutic exercise, therapeutic activity and home program  Decreased strength - therapeutic exercise, therapeutic activities and home program    Therapy Evaluation Codes:   1) History comprised of:   Personal factors that impact the plan of care:      Time since onset of symptoms.    Comorbidity factors that impact the plan of care are:      High blood pressure and Weakness.     Medications impacting care: Anti-inflammatory and High blood pressure.  2) Examination of Body Systems comprised of:   Body structures and functions that impact the plan of care:      Lumbar spine.   Activity limitations that impact the plan of care are:      Lifting, Standing and Walking.  3) Clinical presentation characteristics are:   Stable/Uncomplicated.  4) Decision-Making    Low complexity using standardized patient assessment instrument and/or measureable assessment of functional outcome.  Cumulative Therapy Evaluation is: Low complexity.    Previous and current functional limitations:  (See Goal Flow Sheet for this information)    Short term and Long term goals: (See Goal Flow Sheet for this information)     Communication ability:  Patient  appears to be able to clearly communicate and understand verbal and written communication and follow directions correctly.  Treatment Explanation - The following has been discussed with the patient:   RX ordered/plan of care  Anticipated outcomes  Possible risks and side effects  This patient would benefit from PT intervention to resume normal activities.   Rehab potential is good.    Frequency:  1 X week, once daily  Duration:  for 6 weeks  Discharge Plan:  Achieve all LTG.  Independent in home treatment program.  Reach maximal therapeutic benefit.    Please refer to the daily flowsheet for treatment today, total treatment time and time spent performing 1:1 timed codes.

## 2018-03-27 ENCOUNTER — TELEPHONE (OUTPATIENT)
Dept: INTERNAL MEDICINE | Facility: CLINIC | Age: 78
End: 2018-03-27

## 2018-03-29 ENCOUNTER — THERAPY VISIT (OUTPATIENT)
Dept: PHYSICAL THERAPY | Facility: CLINIC | Age: 78
End: 2018-03-29
Payer: MEDICARE

## 2018-03-29 ENCOUNTER — TRANSFERRED RECORDS (OUTPATIENT)
Dept: HEALTH INFORMATION MANAGEMENT | Facility: CLINIC | Age: 78
End: 2018-03-29

## 2018-03-29 DIAGNOSIS — G89.29 CHRONIC RIGHT-SIDED LOW BACK PAIN WITH RIGHT-SIDED SCIATICA: ICD-10-CM

## 2018-03-29 DIAGNOSIS — M54.41 CHRONIC RIGHT-SIDED LOW BACK PAIN WITH RIGHT-SIDED SCIATICA: ICD-10-CM

## 2018-03-29 PROCEDURE — 97110 THERAPEUTIC EXERCISES: CPT | Mod: GP | Performed by: PHYSICAL THERAPIST

## 2018-03-29 PROCEDURE — 97530 THERAPEUTIC ACTIVITIES: CPT | Mod: GP | Performed by: PHYSICAL THERAPIST

## 2018-03-29 NOTE — PROGRESS NOTES
Subjective:  HPI                    Objective:  System    Physical Exam    General     ROS    Assessment/Plan:    SUBJECTIVE  Subjective changes as noted by pt:  Pt reports decreased back pain but she has not been walking for exercise     Current pain level: 3/10     Changes in function:  Pt can walk while shopping for 30 minutes     Adverse reaction to treatment or activity:  None    OBJECTIVE  Changes in objective findings:  Pt demonstrates improved abdominal strength.         ASSESSMENT  Mariluz continues to require intervention to meet STG and LTG's: PT  Patient's symptoms are resolving.  Response to therapy has shown an improvement in  strength  Progress made towards STG/LTG?  Yes,     PLAN  Current treatment program is being advanced to more complex exercises.    PTA/ATC plan:  N/A    Please refer to the daily flowsheet for treatment today, total treatment time and time spent performing 1:1 timed codes.

## 2018-04-11 ENCOUNTER — THERAPY VISIT (OUTPATIENT)
Dept: PHYSICAL THERAPY | Facility: CLINIC | Age: 78
End: 2018-04-11
Payer: MEDICARE

## 2018-04-11 DIAGNOSIS — G89.29 CHRONIC RIGHT-SIDED LOW BACK PAIN WITH RIGHT-SIDED SCIATICA: ICD-10-CM

## 2018-04-11 DIAGNOSIS — M54.41 CHRONIC RIGHT-SIDED LOW BACK PAIN WITH RIGHT-SIDED SCIATICA: ICD-10-CM

## 2018-04-11 PROCEDURE — 97110 THERAPEUTIC EXERCISES: CPT | Mod: GP | Performed by: PHYSICAL THERAPIST

## 2018-04-11 PROCEDURE — 97530 THERAPEUTIC ACTIVITIES: CPT | Mod: GP | Performed by: PHYSICAL THERAPIST

## 2018-04-11 NOTE — PROGRESS NOTES
Subjective:  HPI                    Objective:  System    Physical Exam    General     ROS    Assessment/Plan:    SUBJECTIVE  Subjective changes as noted by pt:  Pt noted lumbar pain 2 days ago but it has decreased by now. Pt has had to limit some of her exercises secondary to left hip and shoulder pain     Current pain level: 1/10     Changes in function:  Pt still notes pain with vacuuming. Pt limited with walking secondary to the weather.      Adverse reaction to treatment or activity:  None    OBJECTIVE  Changes in objective findings:  Improved strength pelvic stabilizers and lower abdominals. Single leg balance improved.         ASSESSMENT  Mariluz continues to require intervention to meet STG and LTG's: PT  Patient's symptoms are resolving.  Response to therapy has shown an improvement in  pain level and strength  Progress made towards STG/LTG?  Yes,     PLAN  Current treatment program is being advanced to more complex exercises.    PTA/ATC plan:  N/A    Please refer to the daily flowsheet for treatment today, total treatment time and time spent performing 1:1 timed codes.

## 2018-05-15 ENCOUNTER — TELEPHONE (OUTPATIENT)
Dept: CARDIOLOGY | Facility: CLINIC | Age: 78
End: 2018-05-15

## 2018-05-15 DIAGNOSIS — Z13.6 CARDIOVASCULAR SCREENING; LDL GOAL LESS THAN 130: ICD-10-CM

## 2018-05-15 DIAGNOSIS — I10 ESSENTIAL HYPERTENSION, BENIGN: Primary | ICD-10-CM

## 2018-05-15 NOTE — TELEPHONE ENCOUNTER
June 2017 order was placed to increase dose of Lisinopril-HCTZ  From 10-12.5 to 20-12.5. However the pharmacy continued to fill the lower dose of 10-12.5 mg until this week. Patient remained on the dose of Lisinopril 10-12.5 mg over the past year. Still has some remaining pills in this strength.  Now calling as she notes the dose change, and recalls having issues with increasing the lisinopril dose. Unable to recall what the issues were, but is now reluctant to start the increased medication of Lisinopril-HCTZ 20-12.5 mg.   BP over past several months has averaged 128/80 and feels she has not had any BP issues since the amlodipine 5 mg daily was added last August 2017.     Patient of Dr. Park's, who is out of the office this week.   Instructed patient to continue on lower dose, since she has pills to do this. Will review with Dr. Park upon his return, and will update her as to his recommendations.    Also due for annual f/u with labs. Will set her up to f/u with either of his CELINA's , Juanita, or Noemí. Transferred to call to scheduling.

## 2018-05-18 DIAGNOSIS — I48.0 PAROXYSMAL ATRIAL FIBRILLATION (H): ICD-10-CM

## 2018-05-18 RX ORDER — FLECAINIDE ACETATE 50 MG/1
50 TABLET ORAL 2 TIMES DAILY
Qty: 180 TABLET | Refills: 0 | Status: SHIPPED | OUTPATIENT
Start: 2018-05-18 | End: 2018-06-06

## 2018-05-18 NOTE — TELEPHONE ENCOUNTER
Faxed refill request received from [facility] requesting a refill for Flecainide. Last refill 2/16/18 #180 with 1 refill to [facility] in [place]. Call to [facility]. They do not have record of refill received 2/16/18. Refill sent.

## 2018-05-21 ENCOUNTER — RADIANT APPOINTMENT (OUTPATIENT)
Dept: GENERAL RADIOLOGY | Facility: CLINIC | Age: 78
End: 2018-05-21
Attending: INTERNAL MEDICINE
Payer: COMMERCIAL

## 2018-05-21 ENCOUNTER — OFFICE VISIT (OUTPATIENT)
Dept: INTERNAL MEDICINE | Facility: CLINIC | Age: 78
End: 2018-05-21
Payer: COMMERCIAL

## 2018-05-21 VITALS
DIASTOLIC BLOOD PRESSURE: 60 MMHG | SYSTOLIC BLOOD PRESSURE: 110 MMHG | WEIGHT: 128 LBS | TEMPERATURE: 98.4 F | BODY MASS INDEX: 22.68 KG/M2 | OXYGEN SATURATION: 98 % | HEIGHT: 63 IN | HEART RATE: 61 BPM

## 2018-05-21 DIAGNOSIS — M54.2 CERVICALGIA: Primary | ICD-10-CM

## 2018-05-21 DIAGNOSIS — M54.2 CERVICALGIA: ICD-10-CM

## 2018-05-21 PROCEDURE — 72040 X-RAY EXAM NECK SPINE 2-3 VW: CPT

## 2018-05-21 PROCEDURE — 99213 OFFICE O/P EST LOW 20 MIN: CPT | Performed by: INTERNAL MEDICINE

## 2018-05-21 RX ORDER — PREDNISONE 20 MG/1
20 TABLET ORAL 2 TIMES DAILY
Qty: 14 TABLET | Refills: 0 | Status: SHIPPED | OUTPATIENT
Start: 2018-05-21 | End: 2019-05-15

## 2018-05-21 RX ORDER — LISINOPRIL/HYDROCHLOROTHIAZIDE 10-12.5 MG
2 TABLET ORAL DAILY
COMMUNITY
End: 2018-05-22 | Stop reason: DRUGHIGH

## 2018-05-21 NOTE — TELEPHONE ENCOUNTER
I agree. The lower dose seems to be working in combination with amlodipine. Stay on the low dose and follow BP numbers occasionally. Thanks. EE

## 2018-05-21 NOTE — MR AVS SNAPSHOT
After Visit Summary   5/21/2018    Mariluz Echeverria    MRN: [id number]           Patient Information     Date Of Birth          1940        Visit Information        Provider Department      5/21/2018 10:20 AM Oh Rosas MD [facility]        Today's Diagnoses     Cervicalgia    -  1       Follow-ups after your visit        Your next 10 appointments already scheduled     Jun 06, 2018  9:10 AM CDT   LAB with [facility]   [facility] HEART [facility] ([facility])    [address] [place] [id number]   [phone]           Please do not eat 10-12 hours before your appointment if you are coming in fasting for labs on lipids, cholesterol, or glucose (sugar). This does not apply to pregnant women. Water, hot tea and black coffee (with nothing added) are okay. Do not drink other fluids, diet soda or chew gum.            Jun 06, 2018 10:10 AM CDT   Return Visit with Margaret Ford PA-C   [facility] ([facility])    [address] [place], [place] [id number]  [facility] [address]   [phone] OPT 2              Who to contact     If you have questions or need follow up information about today's clinic visit or your schedule please contact [facility] directly at [phone].  Normal or non-critical lab and imaging results will be communicated to you by MyChart, letter or phone within 4 business days after the clinic has received the results. If you do not hear from us within 7 days, please contact the clinic through MyChart or phone. If you have a critical or abnormal lab result, we will notify you by phone as soon as possible.  Submit refill requests through [employer] or call your pharmacy and they will forward the refill request to us. Please allow 3 business days for your refill to be completed.          Additional Information About Your Visit        MyChart Information  "    [employer] lets you send messages to your doctor, view your test results, renew your prescriptions, schedule appointments and more. To sign up, go to [email or web address] . Click on \"Log in\" on the left side of the screen, which will take you to the Welcome page. Then click on \"Sign up Now\" on the right side of the page.     You will be asked to enter the access code listed below, as well as some personal information. Please follow the directions to create your username and password.     Your access code is: [email or web address]  Expires: 2018 12:05 PM     Your access code will  in 90 days. If you need help or a new code, please call your [place] clinic or [phone].        Care EveryWhere ID     This is your Care EveryWhere ID. This could be used by other organizations to access your [place] medical records  [id number]        Your Vitals Were     Pulse Temperature Height Pulse Oximetry BMI (Body Mass Index)       61 98.4  F (36.9  C) (Oral) 5' 3\" (1.6 m) 98% 22.67 kg/m2        Blood Pressure from Last 3 Encounters:   18 110/60   18 116/70   08/15/17 122/74    Weight from Last 3 Encounters:   18 128 lb (58.1 kg)   18 126 lb 4.8 oz (57.3 kg)   08/15/17 123 lb (55.8 kg)                 Today's Medication Changes          These changes are accurate as of 18 11:13 AM.  If you have any questions, ask your nurse or doctor.               Start taking these medicines.        Dose/Directions    predniSONE 20 MG tablet   Commonly known as:  DELTASONE   Used for:  Cervicalgia   Started by:  Oh Rosas MD        Dose:  20 mg   Take 1 tablet (20 mg) by mouth 2 times daily   Quantity:  14 tablet   Refills:  0            Where to get your medicines      These medications were sent to [facility] [address] [place] [address]     Phone:  [phone]     predniSONE 20 MG tablet                Primary Care Provider " Office Phone # Fax #    Yohana Falk [phone]       [address] [facility] [address]  [facility] [address]        Equal Access to Services     CHERYLEMIRAYM DALLAS : Hadii elvira ku hadhadleyo Sojayantali, waaxda luqadaha, qaybta kaalmada adealexayada, jayjay claros yvonnealexa desai laPeemireya davila. So [facility] [phone].    ATENCIÓN: Si habla español, tiene a cuba disposición servicios gratuitos de asistencia lingüística. Llame al [phone].    We comply with applicable federal civil rights laws and [place] laws. We do not discriminate on the basis of race, color, national origin, age, disability, sex, sexual orientation, or gender identity.            Thank you!     Thank you for choosing [facility]  for your care. Our goal is always to provide you with excellent care. Hearing back from our patients is one way we can continue to improve our services. Please take a few minutes to complete the written survey that you may receive in the mail after your visit with us. Thank you!             Your Updated Medication List - Protect others around you: Learn how to safely use, store and throw away your medicines at www.disposemymeds.org.          This list is accurate as of 5/21/18 11:13 AM.  Always use your most recent med list.                   Brand Name Dispense Instructions for use Diagnosis    amLODIPine 5 MG tablet    NORVASC    90 tablet    Take one 5 MG pill by mouth in the morning    Benign essential hypertension       aspirin 81 MG tablet      Take by mouth daily        CALCIUM 600 + D PO      Take 1 tablet by mouth daily        flecainide 50 MG tablet    TAMBOCOR    180 tablet    Take 1 tablet (50 mg) by mouth 2 times daily    Paroxysmal atrial fibrillation (H)       lisinopril-hydrochlorothiazide 10-12.5 MG per tablet    PRINZIDE/ZESTORETIC     Take 2 tablets by mouth daily        METAMUCIL FIBER PO      Take by mouth daily        metoprolol succinate 50 MG 24 hr tablet    TOPROL-XL    90 tablet     Take 1 tablet (50 mg) by mouth daily    Essential hypertension, benign       predniSONE 20 MG tablet    DELTASONE    14 tablet    Take 1 tablet (20 mg) by mouth 2 times daily    Cervicalgia       PRESERVISION/LUTEIN PO      Take 2 capsules by mouth daily

## 2018-05-21 NOTE — PROGRESS NOTES
SUBJECTIVE:   Mariluz Echeverria is a 77 year old female who presents to clinic today for the following health issues:      Rt shoulder pain:      Presents with right lateral neck / shoulder pain.   For 3 months. Worse with longer work with her hands.   Tried Ibuprofen, icing, with temporary improvement.   Worse with longer driving. No radiation , no arm weakness or numbness.   No h/o injury.   Pain in stinging, burning character.     Has h/o LS spine DDD. No flare up of LBP.     Problem list and histories reviewed & adjusted, as indicated.  Additional history: as documented    Patient Active Problem List   Diagnosis     Arthropathy of pelvic region and thigh     Diffuse cystic mastopathy     Urethral stricture     Essential hypertension, benign     Postmenopausal atrophic vaginitis     CARDIOVASCULAR SCREENING; LDL GOAL LESS THAN 130     Advanced directives, counseling/discussion     Urinary retention     First degree AV block     S/P AV conner ablation     Paroxysmal atrial fibrillation (H)     Chronic right-sided low back pain with right-sided sciatica     Past Surgical History:   Procedure Laterality Date     C NONSPECIFIC PROCEDURE      numerous breast aspirations     C NONSPECIFIC PROCEDURE      T&A     C NONSPECIFIC PROCEDURE      left breast bx     C NONSPECIFIC PROCEDURE      numerous breast aspirations     C NONSPECIFIC PROCEDURE      tubal lig (remote)     COLONOSCOPY N/A 8/15/2017    Procedure: COMBINED COLONOSCOPY, SINGLE OR MULTIPLE BIOPSY/POLYPECTOMY BY BIOPSY;  COLONOSCOPY WITH POLYPECTOMY USING COLD FORCEPS;  Surgeon: Leonarda Gaona MD;  Location:  GI     s/p AV conner ablation      At [facility], remote history      wisdom teeth         Social History   Substance Use Topics     Smoking status: Former Smoker     Smokeless tobacco: Never Used     Alcohol use 0.0 oz/week     0 Standard drinks or equivalent per week      Comment: 1 glass wine per day     Family History   Problem Relation Age of  "Onset     Arthritis Mother      Neurologic Disorder Mother      parkinsons     Hypertension Mother      Hypertension Father      CEREBROVASCULAR DISEASE Father      2     HEART DISEASE Father      mi x 2     GASTROINTESTINAL DISEASE Father      bleeding ulcers     CANCER Father      prostate     Hypertension Brother      Breast Cancer Maternal Grandmother          Current Outpatient Prescriptions   Medication Sig Dispense Refill     amLODIPine (NORVASC) 5 MG tablet Take one 5 MG pill by mouth in the morning 90 tablet 3     aspirin 81 MG tablet Take by mouth daily       Calcium Carbonate-Vitamin D (CALCIUM 600 + D OR) Take 1 tablet by mouth daily       flecainide (TAMBOCOR) 50 MG tablet Take 1 tablet (50 mg) by mouth 2 times daily 180 tablet 0     lisinopril-hydrochlorothiazide (PRINZIDE/ZESTORETIC) 10-12.5 MG per tablet Take 2 tablets by mouth daily       metoprolol (TOPROL-XL) 50 MG 24 hr tablet Take 1 tablet (50 mg) by mouth daily 90 tablet 2     Multiple Vitamins-Minerals (PRESERVISION/LUTEIN PO) Take 2 capsules by mouth daily       predniSONE (DELTASONE) 20 MG tablet Take 1 tablet (20 mg) by mouth 2 times daily 14 tablet 0     Psyllium (METAMUCIL FIBER PO) Take by mouth daily         Reviewed and updated as needed this visit by clinical staff       Reviewed and updated as needed this visit by Provider         ROS:  Constitutional, HEENT, cardiovascular, pulmonary, gi and gu systems are negative, except as otherwise noted.    OBJECTIVE:     /60  Pulse 61  Temp 98.4  F (36.9  C) (Oral)  Ht 5' 3\" (1.6 m)  Wt 128 lb (58.1 kg)  SpO2 98%  BMI 22.67 kg/m2  Body mass index is 22.67 kg/(m^2).   GENERAL: healthy, alert and no distress  NECK: no adenopathy, no asymmetry, masses, or scars and thyroid normal to palpation  MS: no gross musculoskeletal defects noted, no edema   palpatory tenderness right upper mid trapezius muscle , normal ROM in the shoulder, slightly restricted neck rotation   NEURO: Normal " strength and tone, mentation intact and speech normal    Diagnostic Test Results:  none     ASSESSMENT/PLAN:     Problem List Items Addressed This Visit     None      Visit Diagnoses     Cervicalgia    -  Primary    Relevant Medications    predniSONE (DELTASONE) 20 MG tablet    Other Relevant Orders    XR Cervical Spine 2/3 Views           Assess C spine X rays - DDD with disc space narrowing C5-C6 and spurs   Start on Prednisone for 1 week   Instructions for home PT, neck exercises given   Consider steroid injection / MRI       Follow-Up:in 2 -3 weeks if needed     Oh Rosas MD  [facility]

## 2018-05-22 ENCOUNTER — TELEPHONE (OUTPATIENT)
Dept: INTERNAL MEDICINE | Facility: CLINIC | Age: 78
End: 2018-05-22

## 2018-05-22 DIAGNOSIS — I10 BENIGN ESSENTIAL HYPERTENSION: Primary | ICD-10-CM

## 2018-05-22 RX ORDER — LISINOPRIL AND HYDROCHLOROTHIAZIDE 20; 25 MG/1; MG/1
1 TABLET ORAL DAILY
Qty: 90 TABLET | Refills: 3 | Status: SHIPPED | OUTPATIENT
Start: 2018-05-22 | End: 2018-06-06

## 2018-05-22 RX ORDER — LISINOPRIL AND HYDROCHLOROTHIAZIDE 20; 25 MG/1; MG/1
1 TABLET ORAL DAILY
Qty: 90 TABLET | Refills: 3 | Status: SHIPPED | OUTPATIENT
Start: 2018-05-22 | End: 2018-05-22 | Stop reason: DRUGHIGH

## 2018-05-22 NOTE — TELEPHONE ENCOUNTER
Patient updated with recommendation of Dr. Park, to remain on the lower dose of the Lisinopril- HCTZ.   Patient states that she has been taking Lisinopril-HCTZ 10/12.5 mg 2 tablets daily in the AM.   Medication does come in 20/25 mg tablets and would like to just take one tablet instead of 2. Will order.   Has upcoming OV on 6/6/18 with CELINA.

## 2018-05-22 NOTE — TELEPHONE ENCOUNTER
Pt states she picked up Prednisone and is worried about the side effects of Insomnia.   She is asking if she can take both doses in the AM. 20 mg + 20 mg together. OR can take 20 mg in AM and take the second 20 mg at lunch time, early afternoon.     Also, She is asking if OK to drink one glass of wine in the evening while on the prednisone.     Please advise for directions and the wine.     .

## 2018-06-01 PROBLEM — M54.41 CHRONIC RIGHT-SIDED LOW BACK PAIN WITH RIGHT-SIDED SCIATICA: Status: RESOLVED | Noted: 2018-03-19 | Resolved: 2018-06-01

## 2018-06-01 PROBLEM — G89.29 CHRONIC RIGHT-SIDED LOW BACK PAIN WITH RIGHT-SIDED SCIATICA: Status: RESOLVED | Noted: 2018-03-19 | Resolved: 2018-06-01

## 2018-06-01 NOTE — PROGRESS NOTES
Subjective:SUBJECTIVE  Subjective changes as noted by pt:  Pt noted lumbar pain 2 days ago but it has decreased by now. Pt has had to limit some of her exercises secondary to left hip and shoulder pain     Current pain level: 1/10     Changes in function:  Pt still notes pain with vacuuming. Pt limited with walking secondary to the weather.      Adverse reaction to treatment or activity:  None     OBJECTIVE  Changes in objective findings:  Improved strength pelvic stabilizers and lower abdominals. Single leg balance improved.   HPI                    Objective:  System    Physical Exam    General     ROS    Assessment/Plan:    ASSESSMENT/PLAN  Updated problem list and treatment plan: Diagnosis 1:  Lumbar pain  Pain -  hot/cold therapy, self management, education and home program  Decreased ROM/flexibility - therapeutic exercise, therapeutic activity and home program  Decreased strength - therapeutic exercise, therapeutic activities and home program  Progress toward STG/LTGs have been made:  Yes,   Assessment of Progress: The patient's condition has potential to improve.  Self Management Plans:  Patient has been instructed in a home treatment program.  I have re-evaluated this patient and find that the nature, scope, duration and intensity of the therapy is appropriate for the medical condition of the patient.  Mariluz continues to require the following intervention to meet STG and LT's:  PT    Recommendations:  Pt has not returned for treatment since 4-11-18. Pt discharged at this time.      Please refer to the daily flowsheet for treatment today, total treatment time and time spent performing 1:1 timed codes.

## 2018-06-06 ENCOUNTER — TELEPHONE (OUTPATIENT)
Dept: CARDIOLOGY | Facility: CLINIC | Age: 78
End: 2018-06-06

## 2018-06-06 ENCOUNTER — OFFICE VISIT (OUTPATIENT)
Dept: CARDIOLOGY | Facility: CLINIC | Age: 78
End: 2018-06-06
Attending: INTERNAL MEDICINE
Payer: COMMERCIAL

## 2018-06-06 VITALS
BODY MASS INDEX: 22.68 KG/M2 | DIASTOLIC BLOOD PRESSURE: 70 MMHG | SYSTOLIC BLOOD PRESSURE: 112 MMHG | HEIGHT: 63 IN | HEART RATE: 60 BPM | WEIGHT: 128 LBS

## 2018-06-06 DIAGNOSIS — I10 ESSENTIAL HYPERTENSION, BENIGN: ICD-10-CM

## 2018-06-06 DIAGNOSIS — I10 BENIGN ESSENTIAL HYPERTENSION: ICD-10-CM

## 2018-06-06 DIAGNOSIS — Z13.6 CARDIOVASCULAR SCREENING; LDL GOAL LESS THAN 130: ICD-10-CM

## 2018-06-06 DIAGNOSIS — I48.0 PAROXYSMAL ATRIAL FIBRILLATION (H): ICD-10-CM

## 2018-06-06 LAB
ALT SERPL W P-5'-P-CCNC: <5 U/L (ref 5–30)
ANION GAP SERPL CALCULATED.3IONS-SCNC: 8.5 MMOL/L (ref 6–17)
BUN SERPL-MCNC: 17 MG/DL (ref 7–30)
CALCIUM SERPL-MCNC: 9.6 MG/DL (ref 8.5–10.5)
CHLORIDE SERPL-SCNC: 95 MMOL/L (ref 98–107)
CHOLEST SERPL-MCNC: 249 MG/DL
CO2 SERPL-SCNC: 32 MMOL/L (ref 23–29)
CREAT SERPL-MCNC: 1.03 MG/DL (ref 0.7–1.3)
GFR SERPL CREATININE-BSD FRML MDRD: 52 ML/MIN/1.7M2
GLUCOSE SERPL-MCNC: 99 MG/DL (ref 70–105)
HDLC SERPL-MCNC: 103 MG/DL
LDLC SERPL CALC-MCNC: 137 MG/DL
NONHDLC SERPL-MCNC: 146 MG/DL
POTASSIUM SERPL-SCNC: 3.5 MMOL/L (ref 3.5–5.1)
SODIUM SERPL-SCNC: 132 MMOL/L (ref 136–145)
TRIGL SERPL-MCNC: 44 MG/DL

## 2018-06-06 PROCEDURE — 80061 LIPID PANEL: CPT | Performed by: INTERNAL MEDICINE

## 2018-06-06 PROCEDURE — 93000 ELECTROCARDIOGRAM COMPLETE: CPT | Performed by: PHYSICIAN ASSISTANT

## 2018-06-06 PROCEDURE — 84460 ALANINE AMINO (ALT) (SGPT): CPT | Performed by: INTERNAL MEDICINE

## 2018-06-06 PROCEDURE — 80048 BASIC METABOLIC PNL TOTAL CA: CPT | Performed by: INTERNAL MEDICINE

## 2018-06-06 PROCEDURE — 36415 COLL VENOUS BLD VENIPUNCTURE: CPT | Performed by: INTERNAL MEDICINE

## 2018-06-06 PROCEDURE — 99214 OFFICE O/P EST MOD 30 MIN: CPT | Performed by: PHYSICIAN ASSISTANT

## 2018-06-06 RX ORDER — LISINOPRIL AND HYDROCHLOROTHIAZIDE 12.5; 2 MG/1; MG/1
1 TABLET ORAL DAILY
Qty: 90 TABLET | Refills: 3 | Status: SHIPPED | OUTPATIENT
Start: 2018-06-06 | End: 2019-07-08

## 2018-06-06 RX ORDER — AMLODIPINE BESYLATE 5 MG/1
TABLET ORAL
Qty: 90 TABLET | Refills: 3 | Status: CANCELLED | OUTPATIENT
Start: 2018-06-06

## 2018-06-06 RX ORDER — FLECAINIDE ACETATE 50 MG/1
50 TABLET ORAL 2 TIMES DAILY
Qty: 180 TABLET | Refills: 3 | Status: SHIPPED | OUTPATIENT
Start: 2018-06-06 | End: 2019-05-15

## 2018-06-06 RX ORDER — METOPROLOL SUCCINATE 50 MG/1
50 TABLET, EXTENDED RELEASE ORAL DAILY
Qty: 90 TABLET | Refills: 2 | Status: SHIPPED | OUTPATIENT
Start: 2018-06-06 | End: 2019-05-03

## 2018-06-06 NOTE — PROGRESS NOTES
Cardiology Progress Note    Date of Service: 06/06/2018  Patient seen today in follow up of: hypertension, PAF  Primary cardiologist: Dr. Park    HPI:  Mariluz Echeverria is a very pleasant 77-year-old female who is followed by Dr. Park for her history of paroxysmal atrial fibrillation and hypertension.  She has a history of recurrent atrial fibrillation and in the past was treated with an atrial fibrillation ablation at [facility].  Initially, this worked quite well and her anticoagulation and antiarrhythmic therapy was discontinued.  She then had recurrent atrial fibrillation and was placed back on flecainide 50 mg twice daily for suppression.  She has not tolerated diltiazem but has tolerated metoprolol XL well.  She has done very well over the last several years and has not had any symptoms of recurrent atrial fibrillation.  Typically, when she has had atrial fibrillation in the past she was quite aware of it.  She also Dr. Park in June 2017 he was doing well at that time.  Her blood pressure was elevated, and her lisinopril was increased to 20 mg.      There was some confusion regarding her lisinopril-HCTZ dose. She recently called the clinic and has been taking lisionpril-HCTZ 20-25 mg for the last several weeks. She is here today for routine follow up. Overall, she has done quite well over the last year. She has no complaints of shortness of breath, chest discomfort, dizziness, lightheadedness, palpitations, or any other new complaints. She does not believe she has had any episodes of atrial fibrillation.     Unfortunately, she has been dealing with some back and shoulder pain which limits her activity. 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 She is however able to ride her stationary bike and does so five times a week without difficulty.     ASSESSMENT/PLAN:  1.  Paroxysmal atrial fibrillation.  With prior ablation at [facility].  She has had  breakthrough episodes since then has been maintained on flecainide which has worked well for her for many years.  She is not on anticoagulation. She does not believe she has had any further episodes of atrial fibrillation, and we will continue her current regimen for now. She will contact us immediately with any episodes.  2.  Hypertension. Currently on lisinopril-hydrochlorothiazide 20 - 25 as well as amlodipine 5 mg and metoprolol XL 50 mg daily. Her basic metabolic panel today shows mild hyponatremia with a sodium of 132 and a drop in her GFR from 71 to 52 compared to last year. I suspect this is related to the increase dose of HCTZ the past several weeks. I recommend we drop her dose back down to lisinopril-HCTZ 20-12.5. She will monitor her blood pressures at home. I asked her to contact us if they creep up and we could increase her amlodipine to 10 mg if needed.  3.  Hyperlipidemia. Her LDL is elevate compared to last year at 137. HDL is 103. We discussed these results in detail today. She is adamant about not going on a statin medication.     Mariluz seems to be doing quite well from a cardiovascular standpoint. She will call us with any concerns otherwise I will have her return to see Dr. Park next year. We will repeat her lipids and an EKG at that time.     This note was completed in part using Dragon voice recognition software. Although reviewed after completion, some word and grammatical errors may occur.    Orders this Visit:  Orders Placed This Encounter   Procedures     Basic metabolic panel     Lipid Profile     ALT     Follow-Up with Cardiologist     EKG 12-lead complete w/read - Clinics (performed today)     EKG 12-lead complete w/read - Clinics (to be scheduled)     Orders Placed This Encounter   Medications     lisinopril-hydrochlorothiazide (PRINZIDE/ZESTORETIC) 20-12.5 MG per tablet     Sig: Take 1 tablet by mouth daily     Dispense:  90 tablet     Refill:  3     flecainide (TAMBOCOR) 50 MG  tablet     Sig: Take 1 tablet (50 mg) by mouth 2 times daily     Dispense:  180 tablet     Refill:  3     Please do not fill until patient calls.     metoprolol succinate (TOPROL-XL) 50 MG 24 hr tablet     Sig: Take 1 tablet (50 mg) by mouth daily     Dispense:  90 tablet     Refill:  2     Please do not fill until patient calls.     Medications Discontinued During This Encounter   Medication Reason     flecainide (TAMBOCOR) 50 MG tablet Reorder     metoprolol (TOPROL-XL) 50 MG 24 hr tablet Reorder     lisinopril-hydrochlorothiazide (PRINZIDE/ZESTORETIC) 20-25 MG per tablet        CURRENT MEDICATIONS:  Current Outpatient Prescriptions   Medication Sig Dispense Refill     amLODIPine (NORVASC) 5 MG tablet Take one 5 MG pill by mouth in the morning 90 tablet 3     aspirin 81 MG tablet Take by mouth daily       Calcium Carbonate-Vitamin D (CALCIUM 600 + D OR) Take 1 tablet by mouth daily       flecainide (TAMBOCOR) 50 MG tablet Take 1 tablet (50 mg) by mouth 2 times daily 180 tablet 3     lisinopril-hydrochlorothiazide (PRINZIDE/ZESTORETIC) 20-12.5 MG per tablet Take 1 tablet by mouth daily 90 tablet 3     metoprolol succinate (TOPROL-XL) 50 MG 24 hr tablet Take 1 tablet (50 mg) by mouth daily 90 tablet 2     Multiple Vitamins-Minerals (PRESERVISION/LUTEIN PO) Take 2 capsules by mouth daily       Psyllium (METAMUCIL FIBER PO) Take by mouth daily       predniSONE (DELTASONE) 20 MG tablet Take 1 tablet (20 mg) by mouth 2 times daily (Patient not taking: Reported on 6/6/2018) 14 tablet 0     [DISCONTINUED] flecainide (TAMBOCOR) 50 MG tablet Take 1 tablet (50 mg) by mouth 2 times daily 180 tablet 0     [DISCONTINUED] lisinopril-hydrochlorothiazide (PRINZIDE/ZESTORETIC) 20-25 MG per tablet Take 1 tablet by mouth daily 90 tablet 3     [DISCONTINUED] metoprolol (TOPROL-XL) 50 MG 24 hr tablet Take 1 tablet (50 mg) by mouth daily 90 tablet 2     ALLERGIES  Allergies   Allergen Reactions     Meperidine Hcl      demerol= nausea      PAST MEDICAL HISTORY:  Past Medical History:   Diagnosis Date     Actinic keratosis     multiple, neris on back      Atrial fibrillation (H)     ablation @ [facility] 1/4/07     Diffuse cystic mastopathy     has prominent chronic L breast cystic structure     Essential hypertension, benign      First degree AV block 5/9/2016    Per Holter 9/18/15      Generalized osteoarthrosis, unspecified site     hands, L hip     Leiomyoma of uterus, unspecified      S/P AV conner ablation     At [facility] [place], remote history      Unspecified arthropathy, pelvic region and thigh     L hip     Urethral stricture unspecified     inactive     PAST SURGICAL HISTORY:  Past Surgical History:   Procedure Laterality Date     C NONSPECIFIC PROCEDURE      numerous breast aspirations     C NONSPECIFIC PROCEDURE      T&A     C NONSPECIFIC PROCEDURE      left breast bx     C NONSPECIFIC PROCEDURE      numerous breast aspirations     C NONSPECIFIC PROCEDURE      tubal lig (remote)     COLONOSCOPY N/A 8/15/2017    Procedure: COMBINED COLONOSCOPY, SINGLE OR MULTIPLE BIOPSY/POLYPECTOMY BY BIOPSY;  COLONOSCOPY WITH POLYPECTOMY USING COLD FORCEPS;  Surgeon: Leonarda Gaona MD;  Location: [facility]     s/p AV conner ablation      At [facility] [place], remote history      wisdom teeth       FAMILY HISTORY:  Family History   Problem Relation Age of Onset     Arthritis Mother      Neurologic Disorder Mother      parkinsons     Hypertension Mother      Hypertension Father      CEREBROVASCULAR DISEASE Father      2     HEART DISEASE Father      mi x 2     GASTROINTESTINAL DISEASE Father      bleeding ulcers     CANCER Father      prostate     Hypertension Brother      Breast Cancer Maternal Grandmother      SOCIAL HISTORY:  Social History     Social History     Marital status:      Spouse name: Jere     Number of children: 3     Years of education: N/A     Occupational History     volunteer      president of  auxiliary     Social History Main Topics      "Smoking status: Former Smoker     Smokeless tobacco: Never Used     Alcohol use 0.0 oz/week     0 Standard drinks or equivalent per week      Comment: 1 glass wine per day     Drug use: No     Sexual activity: Yes     Partners: Male     Birth control/ protection: Post-menopausal     Other Topics Concern     Caffeine Concern No     none     Exercise Yes     4-5 a week. bike ,walk     Social History Narrative     Review of Systems:  Skin:  Negative     Eyes:  Positive for glasses  ENT:  Negative    Respiratory:  Negative    Cardiovascular:  Negative    Gastroenterology: Negative    Genitourinary:  Negative    Musculoskeletal:  Positive for arthritis  Neurologic:  Negative    Psychiatric:  Negative    Heme/Lymph/Imm:  Negative    Endocrine:  Positive for night sweats     Physical Exam:  Vitals: /70  Pulse 60  Ht 1.6 m (5' 3\")  Wt 58.1 kg (128 lb)  BMI 22.67 kg/m2   Wt Readings from Last 4 Encounters:   06/06/18 58.1 kg (128 lb)   05/21/18 58.1 kg (128 lb)   03/09/18 57.3 kg (126 lb 4.8 oz)   08/15/17 55.8 kg (123 lb)     GEN: well nourished, in no acute distress.  HEENT:  Pupils equal, round. Sclerae nonicteric.   NECK: Supple, no masses appreciated.   C/V:  Regular rate and rhythm, no murmur, rub or gallop.    RESP: Respirations are unlabored. Clear to auscultation bilaterally without wheezing, rales, or rhonchi.  GI: Abdomen soft, nontender.  EXTREM: No LE edema.  NEURO: Alert and oriented, cooperative.  SKIN: Warm and dry.     Recent Lab Results:  LIPID RESULTS:  Lab Results   Component Value Date    CHOL 249 (H) 06/06/2018     06/06/2018     (H) 06/06/2018    TRIG 44 06/06/2018    CHOLHDLRATIO 2.3 05/13/2014     LIVER ENZYME RESULTS:  Lab Results   Component Value Date    AST 22 08/11/2017    ALT <5 (L) 06/06/2018     CBC RESULTS:  Lab Results   Component Value Date    WBC 4.8 08/11/2017    RBC 3.99 08/11/2017    HGB 12.4 08/11/2017    HCT 37.0 08/11/2017    MCV 93 08/11/2017    MCH 31.1 " 08/11/2017    MCHC 33.5 08/11/2017    RDW 13.6 08/11/2017     08/11/2017     BMP RESULTS:  Lab Results   Component Value Date     (L) 06/06/2018    POTASSIUM 3.5 06/06/2018    CHLORIDE 95 (L) 06/06/2018    CO2 32 (H) 06/06/2018    ANIONGAP 8.5 06/06/2018    GLC 99 06/06/2018    BUN 17 06/06/2018    CR 1.03 06/06/2018    GFRESTIMATED 52 (L) 06/06/2018    GFRESTBLACK 63 06/06/2018    KRIS 9.6 06/06/2018      A1C RESULTS:  No results found for: A1C  INR RESULTS:  Lab Results   Component Value Date    INR 2.5 03/02/2007    INR 2.8 02/13/2007    INR 1.5@ 09/05/2006       New/Pertinent imaging results since last visit:  none    Margaret Ford PA-C  [facility]

## 2018-06-06 NOTE — MR AVS SNAPSHOT
After Visit Summary   6/6/2018    Mariluz Echeverria    MRN: [id number]           Patient Information     Date Of Birth          1940        Visit Information        Provider Department      6/6/2018 10:10 AM Margaret Ford PA-C [facility]   [facility]        Today's Diagnoses     Paroxysmal atrial fibrillation (H)        Benign essential hypertension        BENIGN HYPERTENSION          Care Instructions    Thank you for your [facility] visit today. Your provider has recommended the following:  Medication Changes:  DECREASE your lisinopril hydrochlorothiazide to 20-12.5 mg today. I sent in a new prescription to your pharmacy.  Recommendations:  -Call us with any concerns or recurrent atrial fibrillation.   -Check your blood pressures at home. If you notice them creeping up with the decrease in the hydrochlorothiazide dose, call us. We can then go up on your amlodipine.   Follow-up:  See Dr. Park next year for follow up.   Reminder:  Please bring in all current medications, over the counter supplements and vitamin bottles to your next appointment.            [facility]            Follow-ups after your visit        Additional Services     Follow-Up with Cardiologist                 Future tests that were ordered for you today     Open Future Orders        Priority Expected Expires Ordered    Basic metabolic panel Routine 6/6/2019 6/7/2019 6/6/2018    EKG 12-lead complete w/read - Clinics (to be scheduled) Routine 6/6/2019 6/7/2019 6/6/2018    Follow-Up with Cardiologist Routine 6/6/2019 6/7/2019 6/6/2018    Lipid Profile Routine 6/6/2019 6/6/2019 6/6/2018    ALT Routine 6/6/2019 6/6/2019 6/6/2018            Who to contact     If you have questions or need follow up information about today's clinic visit or your schedule please contact [facility] directly at [phone].  Normal or non-critical  "lab and imaging results will be communicated to you by MyChart, letter or phone within 4 business days after the clinic has received the results. If you do not hear from us within 7 days, please contact the clinic through MyChart or phone. If you have a critical or abnormal lab result, we will notify you by phone as soon as possible.  Submit refill requests through [employer] or call your pharmacy and they will forward the refill request to us. Please allow 3 business days for your refill to be completed.          Additional Information About Your Visit        Care EveryWhere ID     This is your Care EveryWhere ID. This could be used by other organizations to access your [facility] medical records  [id number]        Your Vitals Were     Pulse Height BMI (Body Mass Index)             60 1.6 m (5' 3\") 22.67 kg/m2          Blood Pressure from Last 3 Encounters:   06/06/18 112/70   05/21/18 110/60   03/09/18 116/70    Weight from Last 3 Encounters:   06/06/18 58.1 kg (128 lb)   05/21/18 58.1 kg (128 lb)   03/09/18 57.3 kg (126 lb 4.8 oz)              We Performed the Following     EKG 12-lead complete w/read - Clinics (performed today)     Follow-Up with Cardiologist          Today's Medication Changes          These changes are accurate as of 6/6/18 10:19 AM.  If you have any questions, ask your nurse or doctor.               Start taking these medicines.        Dose/Directions    lisinopril-hydrochlorothiazide 20-12.5 MG per tablet   Commonly known as:  PRINZIDE/ZESTORETIC   Used for:  Paroxysmal atrial fibrillation (H), Benign essential hypertension   Replaces:  lisinopril-hydrochlorothiazide 20-25 MG per tablet   Started by:  Margaret Ford PA-C        Dose:  1 tablet   Take 1 tablet by mouth daily   Quantity:  90 tablet   Refills:  3         Stop taking these medicines if you haven't already. Please contact your care team if you have questions.     lisinopril-hydrochlorothiazide 20-25 MG per tablet "   Commonly known as:  PRINZIDE/ZESTORETIC   Replaced by:  lisinopril-hydrochlorothiazide 20-12.5 MG per tablet   Stopped by:  Margaret Ford PA-C                Where to get your medicines      These medications were sent to [id number] - [facility] [address] [facility]  [address] [facility] [address]     Phone:  [phone]     flecainide 50 MG tablet    lisinopril-hydrochlorothiazide 20-12.5 MG per tablet    metoprolol succinate 50 MG 24 hr tablet                Primary Care Provider Office Phone # Fax #    Yohana Falk [phone]       [address]  [facility] [address]        Equal Access to Services     LEILANI HAYNES : Hadii elvira may hadasho Soomaali, waaxda luqadaha, qaybta kaalmada adeegyada, jayjay halein haymireya saxena . So [facility] [phone].    ATENCIÓN: Si habla español, tiene a cuba disposición servicios gratuitos de asistencia lingüística. [facility] [phone].    We comply with applicable federal civil rights laws and [place] laws. We do not discriminate on the basis of race, color, national origin, age, disability, sex, sexual orientation, or gender identity.            Thank you!     Thank you for choosing [facility]  for your care. Our goal is always to provide you with excellent care. Hearing back from our patients is one way we can continue to improve our services. Please take a few minutes to complete the written survey that you may receive in the mail after your visit with us. Thank you!             Your Updated Medication List - Protect others around you: Learn how to safely use, store and throw away your medicines at www.disposemymeds.org.          This list is accurate as of 6/6/18 10:19 AM.  Always use your most recent med list.                   Brand Name Dispense Instructions for use Diagnosis    amLODIPine 5 MG tablet    NORVASC    90 tablet    Take one 5 MG  pill by mouth in the morning    Benign essential hypertension       aspirin 81 MG tablet      Take by mouth daily        CALCIUM 600 + D PO      Take 1 tablet by mouth daily        flecainide 50 MG tablet    TAMBOCOR    180 tablet    Take 1 tablet (50 mg) by mouth 2 times daily    Paroxysmal atrial fibrillation (H)       lisinopril-hydrochlorothiazide 20-12.5 MG per tablet    PRINZIDE/ZESTORETIC    90 tablet    Take 1 tablet by mouth daily    Paroxysmal atrial fibrillation (H), Benign essential hypertension       METAMUCIL FIBER PO      Take by mouth daily        metoprolol succinate 50 MG 24 hr tablet    TOPROL-XL    90 tablet    Take 1 tablet (50 mg) by mouth daily    Essential hypertension, benign       predniSONE 20 MG tablet    DELTASONE    14 tablet    Take 1 tablet (20 mg) by mouth 2 times daily    Cervicalgia       PRESERVISION/LUTEIN PO      Take 2 capsules by mouth daily

## 2018-06-06 NOTE — LETTER
6/6/2018    Yohana Falk MD  [address] [facility] [address]  [facility] [address]    RE: Mariluz Echeverria       Dear Colleague,    I had the pleasure of seeing Mariluz Echeverria in the [facility].      Cardiology Progress Note    Date of Service: 06/06/2018  Patient seen today in follow up of: hypertension, PAF  Primary cardiologist: Dr. Park    HPI:  Mariluz Echeverria is a very pleasant 77-year-old female who is followed by Dr. Park for her history of paroxysmal atrial fibrillation and hypertension.  She has a history of recurrent atrial fibrillation and in the past was treated with an atrial fibrillation ablation at [facility].  Initially, this worked quite well and her anticoagulation and antiarrhythmic therapy was discontinued.  She then had recurrent atrial fibrillation and was placed back on flecainide 50 mg twice daily for suppression.  She has not tolerated diltiazem but has tolerated metoprolol XL well.  She has done very well over the last several years and has not had any symptoms of recurrent atrial fibrillation.  Typically, when she has had atrial fibrillation in the past she was quite aware of it.  She also Dr. Park in June 2017 he was doing well at that time.  Her blood pressure was elevated, and her lisinopril was increased to 20 mg.      There was some confusion regarding her lisinopril-HCTZ dose. She recently called the clinic and has been taking lisionpril-HCTZ 20-25 mg for the last several weeks. She is here today for routine follow up. Overall, she has done quite well over the last year. She has no complaints of shortness of breath, chest discomfort, dizziness, lightheadedness, palpitations, or any other new complaints. She does not believe she has had any episodes of atrial fibrillation.     Unfortunately, she has been dealing with some back and shoulder pain which limits her activity. 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 She is however able to ride her stationary bike and does so five times a week without difficulty.     ASSESSMENT/PLAN:  1.  Paroxysmal atrial fibrillation.  With prior ablation at [facility].  She has had breakthrough episodes since then has been maintained on flecainide which has worked well for her for many years.  She is not on anticoagulation. She does not believe she has had any further episodes of atrial fibrillation, and we will continue her current regimen for now. She will contact us immediately with any episodes.  2.  Hypertension. Currently on lisinopril-hydrochlorothiazide 20 - 25 as well as amlodipine 5 mg and metoprolol XL 50 mg daily. Her basic metabolic panel today shows mild hyponatremia with a sodium of 132 and a drop in her GFR from 71 to 52 compared to last year. I suspect this is related to the increase dose of HCTZ the past several weeks. I recommend we drop her dose back down to lisinopril-HCTZ 20-12.5. She will monitor her blood pressures at home. I asked her to contact us if they creep up and we could increase her amlodipine to 10 mg if needed.  3.  Hyperlipidemia. Her LDL is elevate compared to last year at 137. HDL is 103. We discussed these results in detail today. She is adamant about not going on a statin medication.     Mariluz seems to be doing quite well from a cardiovascular standpoint. She will call us with any concerns otherwise I will have her return to see Dr. Park next year. We will repeat her lipids and an EKG at that time.     This note was completed in part using Dragon voice recognition software. Although reviewed after completion, some word and grammatical errors may occur.    Orders this Visit:  Orders Placed This Encounter   Procedures     Basic metabolic panel     Lipid Profile     ALT     Follow-Up with Cardiologist     EKG 12-lead complete w/read - Clinics (performed today)     EKG 12-lead complete w/read - Clinics (to be scheduled)     Orders  Placed This Encounter   Medications     lisinopril-hydrochlorothiazide (PRINZIDE/ZESTORETIC) 20-12.5 MG per tablet     Sig: Take 1 tablet by mouth daily     Dispense:  90 tablet     Refill:  3     flecainide (TAMBOCOR) 50 MG tablet     Sig: Take 1 tablet (50 mg) by mouth 2 times daily     Dispense:  180 tablet     Refill:  3     Please do not fill until patient calls.     metoprolol succinate (TOPROL-XL) 50 MG 24 hr tablet     Sig: Take 1 tablet (50 mg) by mouth daily     Dispense:  90 tablet     Refill:  2     Please do not fill until patient calls.     Medications Discontinued During This Encounter   Medication Reason     flecainide (TAMBOCOR) 50 MG tablet Reorder     metoprolol (TOPROL-XL) 50 MG 24 hr tablet Reorder     lisinopril-hydrochlorothiazide (PRINZIDE/ZESTORETIC) 20-25 MG per tablet        CURRENT MEDICATIONS:  Current Outpatient Prescriptions   Medication Sig Dispense Refill     amLODIPine (NORVASC) 5 MG tablet Take one 5 MG pill by mouth in the morning 90 tablet 3     aspirin 81 MG tablet Take by mouth daily       Calcium Carbonate-Vitamin D (CALCIUM 600 + D OR) Take 1 tablet by mouth daily       flecainide (TAMBOCOR) 50 MG tablet Take 1 tablet (50 mg) by mouth 2 times daily 180 tablet 3     lisinopril-hydrochlorothiazide (PRINZIDE/ZESTORETIC) 20-12.5 MG per tablet Take 1 tablet by mouth daily 90 tablet 3     metoprolol succinate (TOPROL-XL) 50 MG 24 hr tablet Take 1 tablet (50 mg) by mouth daily 90 tablet 2     Multiple Vitamins-Minerals (PRESERVISION/LUTEIN PO) Take 2 capsules by mouth daily       Psyllium (METAMUCIL FIBER PO) Take by mouth daily       predniSONE (DELTASONE) 20 MG tablet Take 1 tablet (20 mg) by mouth 2 times daily (Patient not taking: Reported on 6/6/2018) 14 tablet 0     [DISCONTINUED] flecainide (TAMBOCOR) 50 MG tablet Take 1 tablet (50 mg) by mouth 2 times daily 180 tablet 0     [DISCONTINUED] lisinopril-hydrochlorothiazide (PRINZIDE/ZESTORETIC) 20-25 MG per tablet Take 1 tablet  by mouth daily 90 tablet 3     [DISCONTINUED] metoprolol (TOPROL-XL) 50 MG 24 hr tablet Take 1 tablet (50 mg) by mouth daily 90 tablet 2     ALLERGIES  Allergies   Allergen Reactions     Meperidine Hcl      demerol= nausea     PAST MEDICAL HISTORY:  Past Medical History:   Diagnosis Date     Actinic keratosis     multiple, neris on back      Atrial fibrillation (H)     ablation @ [facility] 1/4/07     Diffuse cystic mastopathy     has prominent chronic L breast cystic structure     Essential hypertension, benign      First degree AV block 5/9/2016    Per Holter 9/18/15      Generalized osteoarthrosis, unspecified site     hands, L hip     Leiomyoma of uterus, unspecified      S/P AV conner ablation     At [facility], remote history      Unspecified arthropathy, pelvic region and thigh     L hip     Urethral stricture unspecified     inactive     PAST SURGICAL HISTORY:  Past Surgical History:   Procedure Laterality Date     C NONSPECIFIC PROCEDURE      numerous breast aspirations     C NONSPECIFIC PROCEDURE      T&A     C NONSPECIFIC PROCEDURE      left breast bx     C NONSPECIFIC PROCEDURE      numerous breast aspirations     C NONSPECIFIC PROCEDURE      tubal lig (remote)     COLONOSCOPY N/A 8/15/2017    Procedure: COMBINED COLONOSCOPY, SINGLE OR MULTIPLE BIOPSY/POLYPECTOMY BY BIOPSY;  COLONOSCOPY WITH POLYPECTOMY USING COLD FORCEPS;  Surgeon: Leonarda Gaona MD;  Location:  GI     s/p AV conner ablation      At [facility], remote history      wisdom teeth       FAMILY HISTORY:  Family History   Problem Relation Age of Onset     Arthritis Mother      Neurologic Disorder Mother      parkinsons     Hypertension Mother      Hypertension Father      CEREBROVASCULAR DISEASE Father      2     HEART DISEASE Father      mi x 2     GASTROINTESTINAL DISEASE Father      bleeding ulcers     CANCER Father      prostate     Hypertension Brother      Breast Cancer Maternal Grandmother      SOCIAL HISTORY:  Social History     Social  "History     Marital status:      Spouse name: Jere     Number of children: 3     Years of education: N/A     Occupational History     volunteer      president of  aux[employer]     Social History Main Topics     Smoking status: Former Smoker     Smokeless tobacco: Never Used     Alcohol use 0.0 oz/week     0 Standard drinks or equivalent per week      Comment: 1 glass wine per day     Drug use: No     Sexual activity: Yes     Partners: Male     Birth control/ protection: Post-menopausal     Other Topics Concern     Caffeine Concern No     none     Exercise Yes     4-5 a week. bike ,walk     Social History Narrative     Review of Systems:  Skin:  Negative     Eyes:  Positive for glasses  ENT:  Negative    Respiratory:  Negative    Cardiovascular:  Negative    Gastroenterology: Negative    Genitourinary:  Negative    Musculoskeletal:  Positive for arthritis  Neurologic:  Negative    Psychiatric:  Negative    Heme/Lymph/Imm:  Negative    Endocrine:  Positive for night sweats     Physical Exam:  Vitals: /70  Pulse 60  Ht 1.6 m (5' 3\")  Wt 58.1 kg (128 lb)  BMI 22.67 kg/m2   Wt Readings from Last 4 Encounters:   06/06/18 58.1 kg (128 lb)   05/21/18 58.1 kg (128 lb)   03/09/18 57.3 kg (126 lb 4.8 oz)   08/15/17 55.8 kg (123 lb)     GEN: well nourished, in no acute distress.  HEENT:  Pupils equal, round. Sclerae nonicteric.   NECK: Supple, no masses appreciated.   C/V:  Regular rate and rhythm, no murmur, rub or gallop.    RESP: Respirations are unlabored. Clear to auscultation bilaterally without wheezing, rales, or rhonchi.  GI: Abdomen soft, nontender.  EXTREM: No LE edema.  NEURO: Alert and oriented, cooperative.  SKIN: Warm and dry.     Recent Lab Results:  LIPID RESULTS:  Lab Results   Component Value Date    CHOL 249 (H) 06/06/2018     06/06/2018     (H) 06/06/2018    TRIG 44 06/06/2018    CHOLHDLRATIO 2.3 05/13/2014     LIVER ENZYME RESULTS:  Lab Results   Component Value Date    AST " 22 08/11/2017    ALT <5 (L) 06/06/2018     CBC RESULTS:  Lab Results   Component Value Date    WBC 4.8 08/11/2017    RBC 3.99 08/11/2017    HGB 12.4 08/11/2017    HCT 37.0 08/11/2017    MCV 93 08/11/2017    MCH 31.1 08/11/2017    MCHC 33.5 08/11/2017    RDW 13.6 08/11/2017     08/11/2017     BMP RESULTS:  Lab Results   Component Value Date     (L) 06/06/2018    POTASSIUM 3.5 06/06/2018    CHLORIDE 95 (L) 06/06/2018    CO2 32 (H) 06/06/2018    ANIONGAP 8.5 06/06/2018    GLC 99 06/06/2018    BUN 17 06/06/2018    CR 1.03 06/06/2018    GFRESTIMATED 52 (L) 06/06/2018    GFRESTBLACK 63 06/06/2018    KRIS 9.6 06/06/2018      A1C RESULTS:  No results found for: A1C  INR RESULTS:  Lab Results   Component Value Date    INR 2.5 03/02/2007    INR 2.8 02/13/2007    INR 1.5@ 09/05/2006       New/Pertinent imaging results since last visit:  none      Thank you for allowing me to participate in the care of your patient.    Sincerely,     Margaret Ford PA-C     [facility]

## 2018-06-06 NOTE — PATIENT INSTRUCTIONS
Thank you for your [facility] visit today. Your provider has recommended the following:  Medication Changes:  DECREASE your lisinopril hydrochlorothiazide to 20-12.5 mg today. I sent in a new prescription to your pharmacy.  Recommendations:  -Call us with any concerns or recurrent atrial fibrillation.   -Check your blood pressures at home. If you notice them creeping up with the decrease in the hydrochlorothiazide dose, call us. We can then go up on your amlodipine.   Follow-up:  See Dr. Park next year for follow up.   Reminder:  Please bring in all current medications, over the counter supplements and vitamin bottles to your next appointment.            [facility]ART [facility]

## 2018-06-06 NOTE — LETTER
6/6/2018    Yohana Falk MD  [address] [facility] [address]  [facility] [address]    RE: Mariluz Echeverria       Dear Colleague,    I had the pleasure of seeing Mariluz Echeverria in the [facility].      Cardiology Progress Note    Date of Service: 06/06/2018  Patient seen today in follow up of: hypertension, PAF  Primary cardiologist: Dr. Park    HPI:  Mariluz Echeverria is a very pleasant 77-year-old female who is followed by Dr. Park for her history of paroxysmal atrial fibrillation and hypertension.  She has a history of recurrent atrial fibrillation and in the past was treated with an atrial fibrillation ablation at [facility].  Initially, this worked quite well and her anticoagulation and antiarrhythmic therapy was discontinued.  She then had recurrent atrial fibrillation and was placed back on flecainide 50 mg twice daily for suppression.  She has not tolerated diltiazem but has tolerated metoprolol XL well.  She has done very well over the last several years and has not had any symptoms of recurrent atrial fibrillation.  Typically, when she has had atrial fibrillation in the past she was quite aware of it.  She also Dr. Park in June 2017 he was doing well at that time.  Her blood pressure was elevated, and her lisinopril was increased to 20 mg.      There was some confusion regarding her lisinopril-HCTZ dose. She recently called the clinic and has been taking lisionpril-HCTZ 20-25 mg for the last several weeks. She is here today for routine follow up. Overall, she has done quite well over the last year. She has no complaints of shortness of breath, chest discomfort, dizziness, lightheadedness, palpitations, or any other new complaints. She does not believe she has had any episodes of atrial fibrillation.     Unfortunately, she has been dealing with some back and shoulder pain which limits her activity. 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 She is however able to ride her stationary bike and does so five times a week without difficulty.     ASSESSMENT/PLAN:  1.  Paroxysmal atrial fibrillation.  With prior ablation at [facility].  She has had breakthrough episodes since then has been maintained on flecainide which has worked well for her for many years.  She is not on anticoagulation. She does not believe she has had any further episodes of atrial fibrillation, and we will continue her current regimen for now. She will contact us immediately with any episodes.  2.  Hypertension. Currently on lisinopril-hydrochlorothiazide 20 - 25 as well as amlodipine 5 mg and metoprolol XL 50 mg daily. Her basic metabolic panel today shows mild hyponatremia with a sodium of 132 and a drop in her GFR from 71 to 52 compared to last year. I suspect this is related to the increase dose of HCTZ the past several weeks. I recommend we drop her dose back down to lisinopril-HCTZ 20-12.5. She will monitor her blood pressures at home. I asked her to contact us if they creep up and we could increase her amlodipine to 10 mg if needed.  3.  Hyperlipidemia. Her LDL is elevate compared to last year at 137. HDL is 103. We discussed these results in detail today. She is adamant about not going on a statin medication.     Mariluz seems to be doing quite well from a cardiovascular standpoint. She will call us with any concerns otherwise I will have her return to see Dr. Park next year. We will repeat her lipids and an EKG at that time.     This note was completed in part using Dragon voice recognition software. Although reviewed after completion, some word and grammatical errors may occur.    Orders this Visit:  Orders Placed This Encounter   Procedures     Basic metabolic panel     Lipid Profile     ALT     Follow-Up with Cardiologist     EKG 12-lead complete w/read - Clinics (performed today)     EKG 12-lead complete w/read - Clinics (to be scheduled)     Orders  Placed This Encounter   Medications     lisinopril-hydrochlorothiazide (PRINZIDE/ZESTORETIC) 20-12.5 MG per tablet     Sig: Take 1 tablet by mouth daily     Dispense:  90 tablet     Refill:  3     flecainide (TAMBOCOR) 50 MG tablet     Sig: Take 1 tablet (50 mg) by mouth 2 times daily     Dispense:  180 tablet     Refill:  3     Please do not fill until patient calls.     metoprolol succinate (TOPROL-XL) 50 MG 24 hr tablet     Sig: Take 1 tablet (50 mg) by mouth daily     Dispense:  90 tablet     Refill:  2     Please do not fill until patient calls.     Medications Discontinued During This Encounter   Medication Reason     flecainide (TAMBOCOR) 50 MG tablet Reorder     metoprolol (TOPROL-XL) 50 MG 24 hr tablet Reorder     lisinopril-hydrochlorothiazide (PRINZIDE/ZESTORETIC) 20-25 MG per tablet        CURRENT MEDICATIONS:  Current Outpatient Prescriptions   Medication Sig Dispense Refill     amLODIPine (NORVASC) 5 MG tablet Take one 5 MG pill by mouth in the morning 90 tablet 3     aspirin 81 MG tablet Take by mouth daily       Calcium Carbonate-Vitamin D (CALCIUM 600 + D OR) Take 1 tablet by mouth daily       flecainide (TAMBOCOR) 50 MG tablet Take 1 tablet (50 mg) by mouth 2 times daily 180 tablet 3     lisinopril-hydrochlorothiazide (PRINZIDE/ZESTORETIC) 20-12.5 MG per tablet Take 1 tablet by mouth daily 90 tablet 3     metoprolol succinate (TOPROL-XL) 50 MG 24 hr tablet Take 1 tablet (50 mg) by mouth daily 90 tablet 2     Multiple Vitamins-Minerals (PRESERVISION/LUTEIN PO) Take 2 capsules by mouth daily       Psyllium (METAMUCIL FIBER PO) Take by mouth daily       predniSONE (DELTASONE) 20 MG tablet Take 1 tablet (20 mg) by mouth 2 times daily (Patient not taking: Reported on 6/6/2018) 14 tablet 0     [DISCONTINUED] flecainide (TAMBOCOR) 50 MG tablet Take 1 tablet (50 mg) by mouth 2 times daily 180 tablet 0     [DISCONTINUED] lisinopril-hydrochlorothiazide (PRINZIDE/ZESTORETIC) 20-25 MG per tablet Take 1 tablet  by mouth daily 90 tablet 3     [DISCONTINUED] metoprolol (TOPROL-XL) 50 MG 24 hr tablet Take 1 tablet (50 mg) by mouth daily 90 tablet 2     ALLERGIES  Allergies   Allergen Reactions     Meperidine Hcl      demerol= nausea     PAST MEDICAL HISTORY:  Past Medical History:   Diagnosis Date     Actinic keratosis     multiple, neris on back      Atrial fibrillation (H)     ablation @ [facility] 1/4/07     Diffuse cystic mastopathy     has prominent chronic L breast cystic structure     Essential hypertension, benign      First degree AV block 5/9/2016    Per Holter 9/18/15      Generalized osteoarthrosis, unspecified site     hands, L hip     Leiomyoma of uterus, unspecified      S/P AV conner ablation     At [facility], remote history      Unspecified arthropathy, pelvic region and thigh     L hip     Urethral stricture unspecified     inactive     PAST SURGICAL HISTORY:  Past Surgical History:   Procedure Laterality Date     C NONSPECIFIC PROCEDURE      numerous breast aspirations     C NONSPECIFIC PROCEDURE      T&A     C NONSPECIFIC PROCEDURE      left breast bx     C NONSPECIFIC PROCEDURE      numerous breast aspirations     C NONSPECIFIC PROCEDURE      tubal lig (remote)     COLONOSCOPY N/A 8/15/2017    Procedure: COMBINED COLONOSCOPY, SINGLE OR MULTIPLE BIOPSY/POLYPECTOMY BY BIOPSY;  COLONOSCOPY WITH POLYPECTOMY USING COLD FORCEPS;  Surgeon: Leonarda Gaona MD;  Location:  GI     s/p AV conner ablation      At [facility], remote history      wisdom teeth       FAMILY HISTORY:  Family History   Problem Relation Age of Onset     Arthritis Mother      Neurologic Disorder Mother      parkinsons     Hypertension Mother      Hypertension Father      CEREBROVASCULAR DISEASE Father      2     HEART DISEASE Father      mi x 2     GASTROINTESTINAL DISEASE Father      bleeding ulcers     CANCER Father      prostate     Hypertension Brother      Breast Cancer Maternal Grandmother      SOCIAL HISTORY:  Social History     Social  "History     Marital status:      Spouse name: Jere     Number of children: 3     Years of education: N/A     Occupational History     volunteer      president of  auxiliary     Social History Main Topics     Smoking status: Former Smoker     Smokeless tobacco: Never Used     Alcohol use 0.0 oz/week     0 Standard drinks or equivalent per week      Comment: 1 glass wine per day     Drug use: No     Sexual activity: Yes     Partners: Male     Birth control/ protection: Post-menopausal     Other Topics Concern     Caffeine Concern No     none     Exercise Yes     4-5 a week. bike ,walk     Social History Narrative     Review of Systems:  Skin:  Negative     Eyes:  Positive for glasses  ENT:  Negative    Respiratory:  Negative    Cardiovascular:  Negative    Gastroenterology: Negative    Genitourinary:  Negative    Musculoskeletal:  Positive for arthritis  Neurologic:  Negative    Psychiatric:  Negative    Heme/Lymph/Imm:  Negative    Endocrine:  Positive for night sweats     Physical Exam:  Vitals: /70  Pulse 60  Ht 1.6 m (5' 3\")  Wt 58.1 kg (128 lb)  BMI 22.67 kg/m2   Wt Readings from Last 4 Encounters:   06/06/18 58.1 kg (128 lb)   05/21/18 58.1 kg (128 lb)   03/09/18 57.3 kg (126 lb 4.8 oz)   08/15/17 55.8 kg (123 lb)     GEN: well nourished, in no acute distress.  HEENT:  Pupils equal, round. Sclerae nonicteric.   NECK: Supple, no masses appreciated.   C/V:  Regular rate and rhythm, no murmur, rub or gallop.    RESP: Respirations are unlabored. Clear to auscultation bilaterally without wheezing, rales, or rhonchi.  GI: Abdomen soft, nontender.  EXTREM: No LE edema.  NEURO: Alert and oriented, cooperative.  SKIN: Warm and dry.     Recent Lab Results:  LIPID RESULTS:  Lab Results   Component Value Date    CHOL 249 (H) 06/06/2018     06/06/2018     (H) 06/06/2018    TRIG 44 06/06/2018    CHOLHDLRATIO 2.3 05/13/2014     LIVER ENZYME RESULTS:  Lab Results   Component Value Date    AST " 22 08/11/2017    ALT <5 (L) 06/06/2018     CBC RESULTS:  Lab Results   Component Value Date    WBC 4.8 08/11/2017    RBC 3.99 08/11/2017    HGB 12.4 08/11/2017    HCT 37.0 08/11/2017    MCV 93 08/11/2017    MCH 31.1 08/11/2017    MCHC 33.5 08/11/2017    RDW 13.6 08/11/2017     08/11/2017     BMP RESULTS:  Lab Results   Component Value Date     (L) 06/06/2018    POTASSIUM 3.5 06/06/2018    CHLORIDE 95 (L) 06/06/2018    CO2 32 (H) 06/06/2018    ANIONGAP 8.5 06/06/2018    GLC 99 06/06/2018    BUN 17 06/06/2018    CR 1.03 06/06/2018    GFRESTIMATED 52 (L) 06/06/2018    GFRESTBLACK 63 06/06/2018    KRIS 9.6 06/06/2018      A1C RESULTS:  No results found for: A1C  INR RESULTS:  Lab Results   Component Value Date    INR 2.5 03/02/2007    INR 2.8 02/13/2007    INR 1.5@ 09/05/2006       New/Pertinent imaging results since last visit:  none    Margaret Ford PA-C  [facility]      Thank you for allowing me to participate in the care of your patient.      Sincerely,     Margaret Ford PA-C     [facility]    cc:   Ivan Park MD  [address]  [place] [address]

## 2018-06-06 NOTE — TELEPHONE ENCOUNTER
Seen this AM with Lisa Ford, CELINA. Once home, recalled that she had started taking Metamucil daily, and was questioning if that could have effected her cholesterol levels.   Lisa update, recommended patient continue to take the metamucil. Did not feel this was effecting the cholesterol numbers.   Patient updated. No further questions.

## 2018-06-07 ENCOUNTER — TRANSFERRED RECORDS (OUTPATIENT)
Dept: HEALTH INFORMATION MANAGEMENT | Facility: CLINIC | Age: 78
End: 2018-06-07

## 2018-06-18 ENCOUNTER — TELEPHONE (OUTPATIENT)
Dept: CARDIOLOGY | Facility: CLINIC | Age: 78
End: 2018-06-18

## 2018-06-18 NOTE — TELEPHONE ENCOUNTER
States she missed a call from someone from our clinic. No notes or indications of who was trying to call her.  Stated while I was on the phone, would like to get an update to Lisa Ford, CELINA whom she saw in the clinic last week with some medication changes due to low sodium. (decresased her lisinopril- HCTZ from 20-25 to 20-12.5 mg.)  States her BP which was 110/67 is now 120/75. No difference in how she has been feeling.     Will message Lisa with an update.

## 2018-07-18 ENCOUNTER — TRANSFERRED RECORDS (OUTPATIENT)
Dept: HEALTH INFORMATION MANAGEMENT | Facility: CLINIC | Age: 78
End: 2018-07-18

## 2018-07-25 ENCOUNTER — DOCUMENTATION ONLY (OUTPATIENT)
Dept: OTHER | Facility: CLINIC | Age: 78
End: 2018-07-25

## 2018-07-30 DIAGNOSIS — I10 BENIGN ESSENTIAL HYPERTENSION: ICD-10-CM

## 2018-07-30 RX ORDER — AMLODIPINE BESYLATE 5 MG/1
TABLET ORAL
Qty: 90 TABLET | Refills: 3 | Status: SHIPPED | OUTPATIENT
Start: 2018-07-30 | End: 2019-07-08

## 2018-08-10 ENCOUNTER — HOSPITAL ENCOUNTER (OUTPATIENT)
Dept: MAMMOGRAPHY | Facility: CLINIC | Age: 78
Discharge: HOME | End: 2018-08-10
Attending: INTERNAL MEDICINE | Admitting: INTERNAL MEDICINE
Payer: MEDICARE

## 2018-08-10 DIAGNOSIS — Z12.39 BREAST CANCER SCREENING: ICD-10-CM

## 2018-08-10 PROCEDURE — 77063 BREAST TOMOSYNTHESIS BI: CPT

## 2018-09-25 ENCOUNTER — TRANSFERRED RECORDS (OUTPATIENT)
Dept: HEALTH INFORMATION MANAGEMENT | Facility: CLINIC | Age: 78
End: 2018-09-25

## 2018-10-04 ENCOUNTER — TRANSFERRED RECORDS (OUTPATIENT)
Dept: HEALTH INFORMATION MANAGEMENT | Facility: CLINIC | Age: 78
End: 2018-10-04

## 2019-02-18 DIAGNOSIS — I48.0 PAROXYSMAL ATRIAL FIBRILLATION (H): ICD-10-CM

## 2019-02-19 NOTE — TELEPHONE ENCOUNTER
Medication previously filled by cardiology.  Patient fails RN protocol based on last office visit with PCP and no recent CBC.  Will forward to cardiology to review.

## 2019-02-19 NOTE — TELEPHONE ENCOUNTER
"Requested Prescriptions   Pending Prescriptions Disp Refills     flecainide (TAMBOCOR) 50 MG tablet [Pharmacy Med Name: FLECAINIDE 50MG     TAB]  Last Written Prescription Date:  6/6/2018  Last Fill Quantity: 180,  # refills: 3   Last office visit: 5/21/2018 with prescribing provider:     Future Office Visit:   180 tablet 1     Sig: TAKE 1 TABLET BY MOUTH TWICE DAILY    Anti Arrhythmic Agents Protocol Failed - 2/18/2019  9:32 AM       Failed - CBC on file in past year    Recent Labs   Lab Test 08/11/17  0911   WBC 4.8   RBC 3.99   HGB 12.4   HCT 37.0                   Failed - Medication needs approval from authorizing provider    Please forward this request to the authorizing provider for approval.          Failed - Recent (6 mo) or future (30 days) visit with authorizing provider's specialty    Patient had office visit in the last 6 months or has a visit in the next 30 days with authorizing provider.  See \"Patient Info\" tab in inbasket, or \"Choose Columns\" in Meds & Orders section of the refill encounter.           Passed - Lipid Panel on file in past year    Recent Labs   Lab Test 06/06/18  0859  05/13/14  0847   CHOL 249*   < > 215*   TRIG 44   < > 25      < > 95   *   < > 115   NHDL 146*   < >  --    VLDL  --   --  5   CHOLHDLRATIO  --   --  2.3    < > = values in this interval not displayed.              Passed - ALT on file in past year    Recent Labs   Lab Test 06/06/18  0859   ALT <5*            Passed - Serum Creatinine on file in past year    Recent Labs   Lab Test 06/06/18  0859   CR 1.03            Passed - Serum Sodium on file in past year    Recent Labs   Lab Test 06/06/18  0859   *             Passed - Serum Potassium on file in past year    Recent Labs   Lab Test 06/06/18  0859   POTASSIUM 3.5            Passed - Patient is 18 years of age or older       Passed - Patient is not pregnant       Passed - No positive pregnancy test on file in past year        "

## 2019-02-28 RX ORDER — FLECAINIDE ACETATE 50 MG/1
50 TABLET ORAL 2 TIMES DAILY
Qty: 180 TABLET | Refills: 1 | Status: SHIPPED | OUTPATIENT
Start: 2019-02-28 | End: 2019-07-08

## 2019-05-02 ENCOUNTER — TRANSFERRED RECORDS (OUTPATIENT)
Dept: HEALTH INFORMATION MANAGEMENT | Facility: CLINIC | Age: 79
End: 2019-05-02

## 2019-05-03 DIAGNOSIS — I10 ESSENTIAL HYPERTENSION, BENIGN: ICD-10-CM

## 2019-05-03 RX ORDER — METOPROLOL SUCCINATE 50 MG/1
50 TABLET, EXTENDED RELEASE ORAL DAILY
Qty: 90 TABLET | Refills: 0 | Status: SHIPPED | OUTPATIENT
Start: 2019-05-03 | End: 2019-07-08

## 2019-05-15 ENCOUNTER — OFFICE VISIT (OUTPATIENT)
Dept: INTERNAL MEDICINE | Facility: CLINIC | Age: 79
End: 2019-05-15
Payer: COMMERCIAL

## 2019-05-15 VITALS
HEIGHT: 63 IN | HEART RATE: 64 BPM | DIASTOLIC BLOOD PRESSURE: 78 MMHG | OXYGEN SATURATION: 100 % | WEIGHT: 125.3 LBS | SYSTOLIC BLOOD PRESSURE: 128 MMHG | RESPIRATION RATE: 16 BRPM | BODY MASS INDEX: 22.2 KG/M2 | TEMPERATURE: 98.3 F

## 2019-05-15 DIAGNOSIS — N64.4 BREAST TENDERNESS: ICD-10-CM

## 2019-05-15 DIAGNOSIS — I10 ESSENTIAL HYPERTENSION, BENIGN: ICD-10-CM

## 2019-05-15 DIAGNOSIS — Z00.00 PREVENTATIVE HEALTH CARE: Primary | ICD-10-CM

## 2019-05-15 DIAGNOSIS — I48.0 PAROXYSMAL ATRIAL FIBRILLATION (H): ICD-10-CM

## 2019-05-15 LAB
ERYTHROCYTE [DISTWIDTH] IN BLOOD BY AUTOMATED COUNT: 14.1 % (ref 10–15)
HCT VFR BLD AUTO: 41.2 % (ref 35–47)
HGB BLD-MCNC: 13.6 G/DL (ref 11.7–15.7)
MCH RBC QN AUTO: 30.7 PG (ref 26.5–33)
MCHC RBC AUTO-ENTMCNC: 33 G/DL (ref 31.5–36.5)
MCV RBC AUTO: 93 FL (ref 78–100)
PLATELET # BLD AUTO: 242 10E9/L (ref 150–450)
RBC # BLD AUTO: 4.43 10E12/L (ref 3.8–5.2)
WBC # BLD AUTO: 6.9 10E9/L (ref 4–11)

## 2019-05-15 PROCEDURE — 80053 COMPREHEN METABOLIC PANEL: CPT | Performed by: INTERNAL MEDICINE

## 2019-05-15 PROCEDURE — 85027 COMPLETE CBC AUTOMATED: CPT | Performed by: INTERNAL MEDICINE

## 2019-05-15 PROCEDURE — 99397 PER PM REEVAL EST PAT 65+ YR: CPT | Performed by: INTERNAL MEDICINE

## 2019-05-15 PROCEDURE — 84443 ASSAY THYROID STIM HORMONE: CPT | Performed by: INTERNAL MEDICINE

## 2019-05-15 PROCEDURE — 80061 LIPID PANEL: CPT | Performed by: INTERNAL MEDICINE

## 2019-05-15 PROCEDURE — 36415 COLL VENOUS BLD VENIPUNCTURE: CPT | Performed by: INTERNAL MEDICINE

## 2019-05-15 ASSESSMENT — ENCOUNTER SYMPTOMS
CONSTIPATION: 0
BREAST MASS: 0
DIARRHEA: 1
ABDOMINAL PAIN: 0
DIZZINESS: 0
HEMATURIA: 0
CHILLS: 0
COUGH: 0
HEMATOCHEZIA: 0

## 2019-05-15 ASSESSMENT — ANXIETY QUESTIONNAIRES
2. NOT BEING ABLE TO STOP OR CONTROL WORRYING: NOT AT ALL
GAD7 TOTAL SCORE: 0
6. BECOMING EASILY ANNOYED OR IRRITABLE: NOT AT ALL
IF YOU CHECKED OFF ANY PROBLEMS ON THIS QUESTIONNAIRE, HOW DIFFICULT HAVE THESE PROBLEMS MADE IT FOR YOU TO DO YOUR WORK, TAKE CARE OF THINGS AT HOME, OR GET ALONG WITH OTHER PEOPLE: NOT DIFFICULT AT ALL
1. FEELING NERVOUS, ANXIOUS, OR ON EDGE: NOT AT ALL
7. FEELING AFRAID AS IF SOMETHING AWFUL MIGHT HAPPEN: NOT AT ALL
3. WORRYING TOO MUCH ABOUT DIFFERENT THINGS: NOT AT ALL
5. BEING SO RESTLESS THAT IT IS HARD TO SIT STILL: NOT AT ALL

## 2019-05-15 ASSESSMENT — ACTIVITIES OF DAILY LIVING (ADL): CURRENT_FUNCTION: NO ASSISTANCE NEEDED

## 2019-05-15 ASSESSMENT — PATIENT HEALTH QUESTIONNAIRE - PHQ9
SUM OF ALL RESPONSES TO PHQ QUESTIONS 1-9: 0
5. POOR APPETITE OR OVEREATING: NOT AT ALL

## 2019-05-15 ASSESSMENT — MIFFLIN-ST. JEOR: SCORE: 1014.55

## 2019-05-15 NOTE — PROGRESS NOTES
"SUBJECTIVE:   Mariluz Echeverria is a 78 year old female who presents for Preventive Visit.  Fasting. Tender breasts & fecal incontinence.  Are you in the first 12 months of your Medicare coverage?  No    Healthy Habits:     In general, how would you rate your overall health?  Good    Frequency of exercise:  4-5 days/week    Duration of exercise:  15-30 minutes    Do you usually eat at least 4 servings of fruit and vegetables a day, include whole grains    & fiber and avoid regularly eating high fat or \"junk\" foods?  Yes    Medication side effects:  None    Ability to successfully perform activities of daily living:  No assistance needed    Home Safety:  Lack of grab bars in the bathroom    Hearing Impairment:  Need to ask people to speak up or repeat themselves and difficulty understanding soft or whispered speech    In the past 6 months, have you been bothered by leaking of urine?  No    In general, how would you rate your overall mental or emotional health?  Excellent      PHQ-2 Total Score: 0    Additional concerns today:  Yes    Do you feel safe in your environment? Yes    Do you have a Health Care Directive? Yes: Advance Directive has been received and scanned.      Fall risk  Fallen 2 or more times in the past year?: No  Any fall with injury in the past year?: No    Cognitive Screening   1) Repeat 3 items (Leader, Season, Table)    2) Clock draw: NORMAL  3) 3 item recall: Recalls 2 objects   Results: NORMAL clock, 1-2 items recalled: COGNITIVE IMPAIRMENT LESS LIKELY    Mini-CogTM Copyright KATLYN Mata. Licensed by the author for use in [facility]; reprinted with permission (miriam@.[facility]). All rights reserved.      Do you have sleep apnea, excessive snoring or daytime drowsiness?: no    Reviewed and updated as needed this visit by clinical staff  Tobacco  Allergies  Meds  Med Hx  Surg Hx  Fam Hx  Soc Hx        Reviewed and updated as needed this visit by Provider        Social History     Tobacco " Use     Smoking status: Former Smoker     Smokeless tobacco: Never Used   Substance Use Topics     Alcohol use: Yes     Alcohol/week: 0.0 oz     Comment: 1 glass wine per day     If you drink alcohol do you typically have >3 drinks per day or >7 drinks per week? No    Alcohol Use 5/15/2019   Prescreen: >3 drinks/day or >7 drinks/week? No   Prescreen: >3 drinks/day or >7 drinks/week? -       Current providers sharing in care for this patient include:    Patient Care Team:  Yohana Falk MD as PCP - General (Internal Medicine)  Oh Rosas MD as Assigned PCP    The following health maintenance items are reviewed in Epic and correct as of today:  Health Maintenance   Topic Date Due     MARY QUESTIONNAIRE 1 YEAR  07/10/1958     PHQ-9 Q1YR  07/10/1958     PNEUMOCOCCAL IMMUNIZATION 65+ LOW/MEDIUM RISK (2 of 2 - PPSV23) 09/08/2016     MEDICARE ANNUAL WELLNESS VISIT  08/08/2018     FALL RISK ASSESSMENT  08/08/2018     INFLUENZA VACCINE (Season Ended) 09/01/2019     DTAP/TDAP/TD IMMUNIZATION (3 - Td) 09/03/2019     COLONOSCOPY Q5 YR  08/15/2022     LIPID SCREEN Q5 YR FEMALE (SYSTEM ASSIGNED)  06/06/2023     ADVANCE DIRECTIVE PLANNING Q5 YRS  07/25/2023     DEXA SCAN SCREENING (SYSTEM ASSIGNED)  Completed     ZOSTER IMMUNIZATION  Completed     IPV IMMUNIZATION  Aged Out     MENINGITIS IMMUNIZATION  Aged Out     BP Readings from Last 3 Encounters:   05/15/19 128/78   06/06/18 112/70   05/21/18 110/60    Wt Readings from Last 3 Encounters:   05/15/19 56.8 kg (125 lb 4.8 oz)   06/06/18 58.1 kg (128 lb)   05/21/18 58.1 kg (128 lb)                      Review of Systems   Constitutional: Negative for chills.   HENT: Negative for congestion.    Eyes: Negative for visual disturbance.   Respiratory: Negative for cough.    Cardiovascular: Negative for chest pain.   Gastrointestinal: Positive for diarrhea. Negative for abdominal pain, constipation and hematochezia.   Breasts:  Positive for tenderness. Negative for breast  "mass and discharge.   Genitourinary: Negative for hematuria, pelvic pain, vaginal bleeding and vaginal discharge.   Neurological: Negative for dizziness.     She has significant tenderness in the tail of the left breast. Clearly over breast tissue not muscle. She has an area on the medical side of the right breast that is tender. No discharge. Pain new the past 3-4 months. Last mammogram over 6 months ago     OBJECTIVE:   /78 (BP Location: Right arm, Patient Position: Chair, Cuff Size: Adult Regular)   Pulse 64   Temp 98.3  F (36.8  C) (Oral)   Resp 16   Ht 1.588 m (5' 2.5\")   Wt 56.8 kg (125 lb 4.8 oz)   SpO2 100%   BMI 22.55 kg/m   Estimated body mass index is 22.55 kg/m  as calculated from the following:    Height as of this encounter: 1.588 m (5' 2.5\").    Weight as of this encounter: 56.8 kg (125 lb 4.8 oz).  Physical Exam  GENERAL: healthy, alert and no distress  EYES: Eyes grossly normal to inspection, PERRL and conjunctivae and sclerae normal  HENT: ear canals and TM's normal, nose and mouth without ulcers or lesions  NECK: no adenopathy, no asymmetry, masses, or scars and thyroid normal to palpation  RESP: lungs clear to auscultation - no rales, rhonchi or wheezes  BREAST: normal without masses, tenderness or nipple discharge and no palpable axillary masses or adenopathy  CV: regular rate and rhythm, normal S1 S2, no S3 or S4, no murmur, click or rub, no peripheral edema and peripheral pulses strong  ABDOMEN: soft, nontender, no hepatosplenomegaly, no masses and bowel sounds normal  MS: no gross musculoskeletal defects noted, no edema  SKIN: no suspicious lesions or rashes  NEURO: Normal strength and tone, mentation intact and speech normal  PSYCH: mentation appears normal, affect normal/bright      ASSESSMENT / PLAN:   1. Preventative health care       2. Breast tenderness   Will check   - GENERAL SURG ADULT REFERRAL  - MA Diagnostic Digital Bilateral; Future    3. Essential hypertension, " "benign  under good control Continue current medications.   - CBC with platelets  - TSH with free T4 reflex  - Comprehensive metabolic panel (BMP + Alb, Alk Phos, ALT, AST, Total. Bili, TP)  - Lipid Profile    4. Paroxysmal atrial fibrillation (H)  Stable on Flecainide       End of Life Planning:  Patient currently has an advanced directive: No.  I have verified the patient's ablity to prepare an advanced directive/make health care decisions.  Literature was provided to assist patient in preparing an advanced directive.    COUNSELING:  Reviewed preventive health counseling, as reflected in patient instructions       Regular exercise       Healthy diet/nutrition    Estimated body mass index is 22.55 kg/m  as calculated from the following:    Height as of this encounter: 1.588 m (5' 2.5\").    Weight as of this encounter: 56.8 kg (125 lb 4.8 oz).         reports that she has quit smoking. She has never used smokeless tobacco.      Appropriate preventive services were discussed with this patient, including applicable screening as appropriate for cardiovascular disease, diabetes, osteopenia/osteoporosis, and glaucoma.  As appropriate for age/gender, discussed screening for colorectal cancer, prostate cancer, breast cancer, and cervical cancer. Checklist reviewing preventive services available has been given to the patient.    Reviewed patients plan of care and provided an AVS. The Basic Care Plan (routine screening as documented in Health Maintenance) for Mariluz meets the Care Plan requirement. This Care Plan has been established and reviewed with the Patient.    Counseling Resources:  ATP IV Guidelines  Pooled Cohorts Equation Calculator  Breast Cancer Risk Calculator  FRAX Risk Assessment  ICSI Preventive Guidelines  Dietary Guidelines for Americans, 2010  [employer] MyPlate  ASA Prophylaxis  Lung CA Screening    Yohana Falk MD  [facility]    Identified Health Risks:  "

## 2019-05-16 ASSESSMENT — ANXIETY QUESTIONNAIRES: GAD7 TOTAL SCORE: 0

## 2019-05-17 LAB
ALBUMIN SERPL-MCNC: 4.1 G/DL (ref 3.4–5)
ALP SERPL-CCNC: 62 U/L (ref 40–150)
ALT SERPL W P-5'-P-CCNC: 22 U/L (ref 0–50)
ANION GAP SERPL CALCULATED.3IONS-SCNC: 12 MMOL/L (ref 3–14)
AST SERPL W P-5'-P-CCNC: 20 U/L (ref 0–45)
BILIRUB SERPL-MCNC: 0.7 MG/DL (ref 0.2–1.3)
BUN SERPL-MCNC: 18 MG/DL (ref 7–30)
CALCIUM SERPL-MCNC: 9.6 MG/DL (ref 8.5–10.1)
CHLORIDE SERPL-SCNC: 99 MMOL/L (ref 94–109)
CHOLEST SERPL-MCNC: 245 MG/DL
CO2 SERPL-SCNC: 27 MMOL/L (ref 20–32)
CREAT SERPL-MCNC: 0.86 MG/DL (ref 0.52–1.04)
GFR SERPL CREATININE-BSD FRML MDRD: 65 ML/MIN/{1.73_M2}
GLUCOSE SERPL-MCNC: 82 MG/DL (ref 70–99)
HDLC SERPL-MCNC: 117 MG/DL
LDLC SERPL CALC-MCNC: 120 MG/DL
NONHDLC SERPL-MCNC: 128 MG/DL
POTASSIUM SERPL-SCNC: 3.6 MMOL/L (ref 3.4–5.3)
PROT SERPL-MCNC: 7.4 G/DL (ref 6.8–8.8)
SODIUM SERPL-SCNC: 138 MMOL/L (ref 133–144)
TRIGL SERPL-MCNC: 42 MG/DL
TSH SERPL DL<=0.005 MIU/L-ACNC: 1.02 MU/L (ref 0.4–4)

## 2019-05-22 ENCOUNTER — HOSPITAL ENCOUNTER (OUTPATIENT)
Dept: MAMMOGRAPHY | Facility: CLINIC | Age: 79
Discharge: HOME | End: 2019-05-22
Attending: INTERNAL MEDICINE | Admitting: INTERNAL MEDICINE
Payer: COMMERCIAL

## 2019-05-22 ENCOUNTER — HOSPITAL ENCOUNTER (OUTPATIENT)
Dept: ULTRASOUND IMAGING | Facility: CLINIC | Age: 79
End: 2019-05-22
Attending: INTERNAL MEDICINE
Payer: COMMERCIAL

## 2019-05-22 DIAGNOSIS — N64.4 BREAST TENDERNESS: ICD-10-CM

## 2019-05-22 PROCEDURE — G0279 TOMOSYNTHESIS, MAMMO: HCPCS

## 2019-05-22 PROCEDURE — 76642 ULTRASOUND BREAST LIMITED: CPT | Mod: 50

## 2019-05-22 PROCEDURE — 77066 DX MAMMO INCL CAD BI: CPT

## 2019-07-01 ENCOUNTER — OFFICE VISIT (OUTPATIENT)
Dept: INTERNAL MEDICINE | Facility: CLINIC | Age: 79
End: 2019-07-01
Payer: COMMERCIAL

## 2019-07-01 VITALS
SYSTOLIC BLOOD PRESSURE: 110 MMHG | TEMPERATURE: 98.4 F | HEIGHT: 62 IN | RESPIRATION RATE: 16 BRPM | BODY MASS INDEX: 23.37 KG/M2 | HEART RATE: 62 BPM | OXYGEN SATURATION: 97 % | DIASTOLIC BLOOD PRESSURE: 70 MMHG | WEIGHT: 127 LBS

## 2019-07-01 DIAGNOSIS — Z01.818 PREOP GENERAL PHYSICAL EXAM: Primary | ICD-10-CM

## 2019-07-01 DIAGNOSIS — I10 ESSENTIAL HYPERTENSION, BENIGN: ICD-10-CM

## 2019-07-01 DIAGNOSIS — H25.9 AGE-RELATED CATARACT OF BOTH EYES, UNSPECIFIED AGE-RELATED CATARACT TYPE: ICD-10-CM

## 2019-07-01 DIAGNOSIS — Z98.890 S/P AV NODAL ABLATION: ICD-10-CM

## 2019-07-01 PROCEDURE — 99214 OFFICE O/P EST MOD 30 MIN: CPT | Performed by: FAMILY MEDICINE

## 2019-07-01 ASSESSMENT — MIFFLIN-ST. JEOR: SCORE: 1018.29

## 2019-07-01 NOTE — PROGRESS NOTES
[place] [address]  [facility] [address]  [phone]  Dept: [phone]    PRE-OP EVALUATION:  Today's date: 2019    Mariluz Echeverria (: 1940) presents for pre-operative evaluation assessment as requested by Dr. DARRYN Park .  She requires evaluation and anesthesia risk assessment prior to undergoing surgery/procedure for treatment of Right eye  Removal cataract     [facility]   19    Proposed Surgery/ Procedure: Right eye cataract   Date of Surgery/ Procedure: 19  Time of Surgery/ Procedure: 12:30 pm   Hospital/Surgical Facility: [facility]     Primary Physician: Yohana Falk  Type of Anesthesia Anticipated: to be determined    Patient has a Health Care Directive or Living Will:  YES     1. NO - Do you have a history of heart attack, stroke, stent, bypass or surgery on an artery in the head, neck, heart or legs?  2. YES - DO YOU EVER HAVE ANY PAIN OR DISCOMFORT IN YOUR CHEST? past a fib, no recent CP.  2. YES  - Do you ever have any pain or discomfort in your chest? See above.  3. NO - Do you have a history of  Heart Failure?  4. NO - Are you troubled by shortness of breath when: walking on the level, up a slight hill or at night?  5. NO - Do you currently have a cold, bronchitis or other respiratory infection?  6. NO - Do you have a cough, shortness of breath or wheezing?  7. NO - Do you sometimes get pains in the calves of your legs when you walk?  8. NO - Do you or anyone in your family have previous history of blood clots?  9. NO - Do you or does anyone in your family have a serious bleeding problem such as prolonged bleeding following surgeries or cuts?  10. NO - Have you ever had problems with anemia or been told to take iron pills?  11. NO - Have you had any abnormal blood loss such as black, tarry or bloody stools, or abnormal vaginal bleeding?  12. NO - Have you ever had a blood transfusion?  13. NO - Have you or any of your relatives  ever had problems with anesthesia?  14. NO - Do you have sleep apnea, excessive snoring or daytime drowsiness?  15. NO - Do you have any prosthetic heart valves?  16. NO - Do you have prosthetic joints?  17. NO - Is there any chance that you may be pregnant?      HPI:     HPI related to upcoming procedure:     She has developed bilateral cataracts. R eye first.        No h/o Anesthesia complications.    MEDICAL HISTORY:     Patient Active Problem List    Diagnosis Date Noted     Paroxysmal atrial fibrillation (H) 10/26/2016     Priority: Medium     First degree AV block 05/09/2016     Priority: Medium     Per Holter 9/18/15       S/P AV conner ablation 05/09/2016     Priority: Medium     At [facility], remote history       Urinary retention 09/07/2011     Priority: Medium     Advanced directives, counseling/discussion 07/07/2011     Priority: Medium     CARDIOVASCULAR SCREENING; LDL GOAL LESS THAN 130 02/01/2011     Priority: Medium     Postmenopausal atrophic vaginitis 11/04/2003     Priority: Medium     Arthropathy of pelvic region and thigh 11/20/2002     Priority: Medium     Problem list name updated by automated process. Provider to review       Diffuse cystic mastopathy 11/20/2002     Priority: Medium     Urethral stricture 11/20/2002     Priority: Medium     Problem list name updated by automated process. Provider to review       Essential hypertension, benign 11/20/2002     Priority: Medium      Past Medical History:   Diagnosis Date     Actinic keratosis     multiple, neris on back      Atrial fibrillation (H)     ablation @ [facility] 1/4/07     Diffuse cystic mastopathy     has prominent chronic L breast cystic structure     Essential hypertension, benign      First degree AV block 5/9/2016    Per Holter 9/18/15      Generalized osteoarthrosis, unspecified site     hands, L hip     Leiomyoma of uterus, unspecified      S/P AV conner ablation     At [facility], remote history      Unspecified arthropathy, pelvic  region and thigh     L hip     Urethral stricture unspecified     inactive     Past Surgical History:   Procedure Laterality Date     C NONSPECIFIC PROCEDURE      numerous breast aspirations     C NONSPECIFIC PROCEDURE      T&A     C NONSPECIFIC PROCEDURE      left breast bx     C NONSPECIFIC PROCEDURE      numerous breast aspirations     C NONSPECIFIC PROCEDURE      tubal lig (remote)     COLONOSCOPY N/A 8/15/2017    Procedure: COMBINED COLONOSCOPY, SINGLE OR MULTIPLE BIOPSY/POLYPECTOMY BY BIOPSY;  COLONOSCOPY WITH POLYPECTOMY USING COLD FORCEPS;  Surgeon: Leonarda Gaona MD;  Location:  GI     s/p AV conner ablation  2007    At [facility], remote history      wisdom teeth       Current Outpatient Medications   Medication Sig Dispense Refill     amLODIPine (NORVASC) 5 MG tablet Take one 5 MG pill by mouth in the morning 90 tablet 3     Calcium Carbonate-Vitamin D (CALCIUM 600 + D OR) Take 1 tablet by mouth daily       flecainide (TAMBOCOR) 50 MG tablet Take 1 tablet (50 mg) by mouth 2 times daily 180 tablet 1     lisinopril-hydrochlorothiazide (PRINZIDE/ZESTORETIC) 20-12.5 MG per tablet Take 1 tablet by mouth daily 90 tablet 3     metoprolol succinate ER (TOPROL-XL) 50 MG 24 hr tablet Take 1 tablet (50 mg) by mouth daily 90 tablet 0     Multiple Vitamins-Minerals (PRESERVISION/LUTEIN PO) Take 2 capsules by mouth daily       OTC products: none    Allergies   Allergen Reactions     Meperidine Hcl      demerol= nausea      Latex Allergy: NO    Social History     Tobacco Use     Smoking status: Former Smoker     Smokeless tobacco: Never Used   Substance Use Topics     Alcohol use: Yes     Alcohol/week: 0.0 oz     Comment: 1 glass wine per day     History   Drug Use No       REVIEW OF SYSTEMS:   Constitutional, neuro, ENT, endocrine, pulmonary, cardiac, gastrointestinal, genitourinary, musculoskeletal, integument and psychiatric systems are negative, except as otherwise noted.    EXAM:   /70   Pulse 62    "Temp 98.4  F (36.9  C) (Oral)   Resp 16   Ht 1.581 m (5' 2.25\")   Wt 57.6 kg (127 lb)   SpO2 97%   BMI 23.04 kg/m      GENERAL APPEARANCE: healthy, alert and no distress     EYES: EOMI, PERRL, no redness     HENT:  mouth without ulcers or lesions     NECK: no adenopathy, no asymmetry, masses, or scars and thyroid normal to palpation     RESP: lungs clear to auscultation - no rales, rhonchi or wheezes     CV: regular rates and rhythm,  no murmur, click or rub     ABDOMEN:  soft, nontender, no HSM or masses      MS: extremities normal- no gross deformities noted     SKIN: no suspicious lesions or rashes     NEURO: Normal strength and tone, sensory exam grossly normal, mentation intact and speech normal     PSYCH: mentation appears normal. and affect normal/bright     LYMPHATICS: No cervical adenopathy    DIAGNOSTICS:   No labs or EKG required for low risk surgery (cataract, skin procedure, breast biopsy, etc)    Recent Labs   Lab Test 05/15/19  1030 06/06/18  0859  08/11/17  0911   HGB 13.6  --   --  12.4     --   --  227    132*  --  136   POTASSIUM 3.6 3.5   < > 3.3*   CR 0.86 1.03  --  0.79    < > = values in this interval not displayed.        IMPRESSION:   Diagnosis/reason for consult:       (Z01.818) Preop general physical exam  (primary encounter diagnosis)  Comment: satis  Plan:     (H25.9) Age-related cataract of both eyes, unspecified age-related cataract type  Comment:   Plan:     (I10) Essential hypertension, benign  Comment:   Plan:     (Z98.890) S/P AV conner ablation  Comment: currently NSR  Plan:         The proposed surgical procedure is considered LOW risk.    REVISED CARDIAC RISK INDEX  The patient has the following serious cardiovascular risks for perioperative complications such as (MI, PE, VFib and 3  AV Block):  No serious cardiac risks  INTERPRETATION: 1 risks: Class II (low risk - 0.9% complication rate)    The patient has the following additional risks for perioperative " complications:  No identified additional risks      ICD-10-CM    1. Preop general physical exam Z01.818    2. Age-related cataract of both eyes, unspecified age-related cataract type H25.9    3. Essential hypertension, benign I10    4. S/P AV conner ablation Z98.890        RECOMMENDATIONS:       --Patient is to take all scheduled medications on the day of surgery EXCEPT for modifications listed below.    APPROVAL GIVEN to proceed with proposed procedure, without further diagnostic evaluation       Signed Electronically by: Melchor Powers MD    Copy of this evaluation report is provided to requesting physician.    [facility] Preop Guidelines    Revised Cardiac Risk Index

## 2019-07-08 ENCOUNTER — OFFICE VISIT (OUTPATIENT)
Dept: CARDIOLOGY | Facility: CLINIC | Age: 79
End: 2019-07-08
Payer: COMMERCIAL

## 2019-07-08 VITALS
DIASTOLIC BLOOD PRESSURE: 78 MMHG | SYSTOLIC BLOOD PRESSURE: 125 MMHG | BODY MASS INDEX: 23.92 KG/M2 | HEART RATE: 56 BPM | HEIGHT: 62 IN | WEIGHT: 130 LBS

## 2019-07-08 DIAGNOSIS — I48.0 PAROXYSMAL ATRIAL FIBRILLATION (H): Primary | ICD-10-CM

## 2019-07-08 DIAGNOSIS — I10 BENIGN ESSENTIAL HYPERTENSION: ICD-10-CM

## 2019-07-08 PROCEDURE — 93000 ELECTROCARDIOGRAM COMPLETE: CPT | Performed by: INTERNAL MEDICINE

## 2019-07-08 PROCEDURE — 99214 OFFICE O/P EST MOD 30 MIN: CPT | Mod: 25 | Performed by: INTERNAL MEDICINE

## 2019-07-08 RX ORDER — LISINOPRIL AND HYDROCHLOROTHIAZIDE 12.5; 2 MG/1; MG/1
1 TABLET ORAL DAILY
Qty: 90 TABLET | Refills: 3 | Status: SHIPPED | OUTPATIENT
Start: 2019-07-08 | End: 2020-07-20

## 2019-07-08 RX ORDER — METOPROLOL SUCCINATE 50 MG/1
50 TABLET, EXTENDED RELEASE ORAL DAILY
Qty: 90 TABLET | Refills: 3 | Status: SHIPPED | OUTPATIENT
Start: 2019-07-08 | End: 2020-07-20

## 2019-07-08 RX ORDER — FLECAINIDE ACETATE 50 MG/1
50 TABLET ORAL 2 TIMES DAILY
Qty: 180 TABLET | Refills: 3 | Status: SHIPPED | OUTPATIENT
Start: 2019-07-08 | End: 2020-07-20

## 2019-07-08 RX ORDER — AMLODIPINE BESYLATE 5 MG/1
TABLET ORAL
Qty: 90 TABLET | Refills: 3 | Status: SHIPPED | OUTPATIENT
Start: 2019-07-08 | End: 2020-07-20

## 2019-07-08 ASSESSMENT — MIFFLIN-ST. JEOR: SCORE: 1031.8

## 2019-07-08 NOTE — PROGRESS NOTES
HPI and Plan:   See dictation    Orders Placed This Encounter   Procedures     Follow-Up with Cardiologist     EKG 12-lead complete w/read - Clinics (performed today)     EKG 12-lead complete w/read - Clinics (to be scheduled)       Orders Placed This Encounter   Medications     flecainide (TAMBOCOR) 50 MG tablet     Sig: Take 1 tablet (50 mg) by mouth 2 times daily     Dispense:  180 tablet     Refill:  3     amLODIPine (NORVASC) 5 MG tablet     Sig: Take one 5 MG pill by mouth in the morning     Dispense:  90 tablet     Refill:  3     lisinopril-hydrochlorothiazide (PRINZIDE/ZESTORETIC) 20-12.5 MG tablet     Sig: Take 1 tablet by mouth daily     Dispense:  90 tablet     Refill:  3     metoprolol succinate ER (TOPROL-XL) 50 MG 24 hr tablet     Sig: Take 1 tablet (50 mg) by mouth daily     Dispense:  90 tablet     Refill:  3       Medications Discontinued During This Encounter   Medication Reason     flecainide (TAMBOCOR) 50 MG tablet Reorder     amLODIPine (NORVASC) 5 MG tablet Reorder     lisinopril-hydrochlorothiazide (PRINZIDE/ZESTORETIC) 20-12.5 MG per tablet Reorder     metoprolol succinate ER (TOPROL-XL) 50 MG 24 hr tablet Reorder         Encounter Diagnoses   Name Primary?     Paroxysmal atrial fibrillation (H) Yes     Benign essential hypertension        CURRENT MEDICATIONS:  Current Outpatient Medications   Medication Sig Dispense Refill     amLODIPine (NORVASC) 5 MG tablet Take one 5 MG pill by mouth in the morning 90 tablet 3     Calcium Carbonate-Vitamin D (CALCIUM 600 + D OR) Take 1 tablet by mouth daily       flecainide (TAMBOCOR) 50 MG tablet Take 1 tablet (50 mg) by mouth 2 times daily 180 tablet 3     lisinopril-hydrochlorothiazide (PRINZIDE/ZESTORETIC) 20-12.5 MG tablet Take 1 tablet by mouth daily 90 tablet 3     metoprolol succinate ER (TOPROL-XL) 50 MG 24 hr tablet Take 1 tablet (50 mg) by mouth daily 90 tablet 3     Multiple Vitamins-Minerals (PRESERVISION/LUTEIN PO) Take 2 capsules by mouth  daily         ALLERGIES     Allergies   Allergen Reactions     Meperidine Hcl      demerol= nausea       PAST MEDICAL HISTORY:  Past Medical History:   Diagnosis Date     Actinic keratosis     multiple, neris on back      Atrial fibrillation (H)     ablation @ [facility] 1/4/07     Diffuse cystic mastopathy     has prominent chronic L breast cystic structure     Essential hypertension, benign      First degree AV block 5/9/2016    Per Holter 9/18/15      Generalized osteoarthrosis, unspecified site     hands, L hip     Leiomyoma of uterus, unspecified      S/P AV conner ablation     At [facility], remote history      Unspecified arthropathy, pelvic region and thigh     L hip     Urethral stricture unspecified     inactive       PAST SURGICAL HISTORY:  Past Surgical History:   Procedure Laterality Date     C NONSPECIFIC PROCEDURE      numerous breast aspirations     C NONSPECIFIC PROCEDURE      T&A     C NONSPECIFIC PROCEDURE      left breast bx     C NONSPECIFIC PROCEDURE      numerous breast aspirations     C NONSPECIFIC PROCEDURE      tubal lig (remote)     COLONOSCOPY N/A 8/15/2017    Procedure: COMBINED COLONOSCOPY, SINGLE OR MULTIPLE BIOPSY/POLYPECTOMY BY BIOPSY;  COLONOSCOPY WITH POLYPECTOMY USING COLD FORCEPS;  Surgeon: Leonarda Gaona MD;  Location:  GI     s/p AV conner ablation  2007    At [facility], remote history      wisdom teeth         FAMILY HISTORY:  Family History   Problem Relation Age of Onset     Arthritis Mother      Neurologic Disorder Mother         parkinsons     Hypertension Mother      Hypertension Father      Cerebrovascular Disease Father         2     Heart Disease Father         mi x 2     Gastrointestinal Disease Father         bleeding ulcers     Cancer Father         prostate     Hypertension Brother      Breast Cancer Maternal Grandmother        SOCIAL HISTORY:  Social History     Socioeconomic History     Marital status:      Spouse name: Jere     Number of children: 3      Years of education: None     Highest education level: None   Occupational History     Occupation: volunteer     Comment: president of  auxiliary   Social Needs     Financial resource strain: None     Food insecurity:     Worry: None     Inability: None     Transportation needs:     Medical: None     Non-medical: None   Tobacco Use     Smoking status: Former Smoker     Smokeless tobacco: Never Used   Substance and Sexual Activity     Alcohol use: Yes     Alcohol/week: 0.0 oz     Comment: 1 glass wine per day     Drug use: No     Sexual activity: Yes     Partners: Male     Birth control/protection: Post-menopausal   Lifestyle     Physical activity:     Days per week: None     Minutes per session: None     Stress: None   Relationships     Social connections:     Talks on phone: None     Gets together: None     Attends Restoration service: None     Active member of club or organization: None     Attends meetings of clubs or organizations: None     Relationship status: None     Intimate partner violence:     Fear of current or ex partner: None     Emotionally abused: None     Physically abused: None     Forced sexual activity: None   Other Topics Concern     Parent/sibling w/ CABG, MI or angioplasty before 65F 55M? Not Asked      Service Not Asked     Blood Transfusions Not Asked     Caffeine Concern No     Comment: none     Occupational Exposure Not Asked     Hobby Hazards Not Asked     Sleep Concern Not Asked     Stress Concern Not Asked     Weight Concern Not Asked     Special Diet Not Asked     Back Care Not Asked     Exercise Yes     Comment: 4-5 a week. bike ,walk     Bike Helmet Not Asked     Seat Belt Not Asked     Self-Exams Not Asked   Social History Narrative     None       Review of Systems:  Skin:  Negative       Eyes:  Positive for glasses    ENT:  Negative      Respiratory:  Negative       Cardiovascular:  Negative      Gastroenterology: Negative      Genitourinary:  Negative      Musculoskeletal:   "Positive for arthritis    Neurologic:  Negative      Psychiatric:  Negative      Heme/Lymph/Imm:  Negative      Endocrine:  Positive for night sweats occ    Physical Exam:  Vitals: /78   Pulse 56   Ht 1.581 m (5' 2.24\")   Wt 59 kg (130 lb)   BMI 23.59 kg/m      Constitutional:  cooperative, alert and oriented, well developed, well nourished, in no acute distress        Skin:  warm and dry to the touch, no apparent skin lesions or masses noted          Head:  normocephalic, no masses or lesions        Eyes:           Lymph:      ENT:  no pallor or cyanosis, dentition good        Neck:           Respiratory:  normal breath sounds, clear to auscultation, normal A-P diameter, normal symmetry, normal respiratory excursion, no use of accessory muscles         Cardiac: regular rhythm, normal S1/S2, no S3 or S4, apical impulse not displaced, no murmurs, gallops or rubs                pulses full and equal, no bruits auscultated                                        GI:  abdomen soft, non-tender, BS normoactive, no mass, no HSM, no bruits        Extremities and Muscular Skeletal:  no deformities, clubbing, cyanosis, erythema observed;no edema              Neurological:           Psych:           CC  No referring provider defined for this encounter.              "

## 2019-07-08 NOTE — LETTER
7/8/2019    Yohana Falk MD  [address]  [facility] [address]    RE: Mariluz Echeverria       Dear Colleague,    I had the pleasure of seeing Mariluz Echeverria in the [facility].    HPI and Plan:   See dictation    Orders Placed This Encounter   Procedures     Follow-Up with Cardiologist     EKG 12-lead complete w/read - Clinics (performed today)     EKG 12-lead complete w/read - Clinics (to be scheduled)       Orders Placed This Encounter   Medications     flecainide (TAMBOCOR) 50 MG tablet     Sig: Take 1 tablet (50 mg) by mouth 2 times daily     Dispense:  180 tablet     Refill:  3     amLODIPine (NORVASC) 5 MG tablet     Sig: Take one 5 MG pill by mouth in the morning     Dispense:  90 tablet     Refill:  3     lisinopril-hydrochlorothiazide (PRINZIDE/ZESTORETIC) 20-12.5 MG tablet     Sig: Take 1 tablet by mouth daily     Dispense:  90 tablet     Refill:  3     metoprolol succinate ER (TOPROL-XL) 50 MG 24 hr tablet     Sig: Take 1 tablet (50 mg) by mouth daily     Dispense:  90 tablet     Refill:  3       Medications Discontinued During This Encounter   Medication Reason     flecainide (TAMBOCOR) 50 MG tablet Reorder     amLODIPine (NORVASC) 5 MG tablet Reorder     lisinopril-hydrochlorothiazide (PRINZIDE/ZESTORETIC) 20-12.5 MG per tablet Reorder     metoprolol succinate ER (TOPROL-XL) 50 MG 24 hr tablet Reorder         Encounter Diagnoses   Name Primary?     Paroxysmal atrial fibrillation (H) Yes     Benign essential hypertension        CURRENT MEDICATIONS:  Current Outpatient Medications   Medication Sig Dispense Refill     amLODIPine (NORVASC) 5 MG tablet Take one 5 MG pill by mouth in the morning 90 tablet 3     Calcium Carbonate-Vitamin D (CALCIUM 600 + D OR) Take 1 tablet by mouth daily       flecainide (TAMBOCOR) 50 MG tablet Take 1 tablet (50 mg) by mouth 2 times daily 180 tablet 3     lisinopril-hydrochlorothiazide (PRINZIDE/ZESTORETIC) 20-12.5 MG tablet  Take 1 tablet by mouth daily 90 tablet 3     metoprolol succinate ER (TOPROL-XL) 50 MG 24 hr tablet Take 1 tablet (50 mg) by mouth daily 90 tablet 3     Multiple Vitamins-Minerals (PRESERVISION/LUTEIN PO) Take 2 capsules by mouth daily         ALLERGIES     Allergies   Allergen Reactions     Meperidine Hcl      demerol= nausea       PAST MEDICAL HISTORY:  Past Medical History:   Diagnosis Date     Actinic keratosis     multiple, neris on back      Atrial fibrillation (H)     ablation @ [facility] 1/4/07     Diffuse cystic mastopathy     has prominent chronic L breast cystic structure     Essential hypertension, benign      First degree AV block 5/9/2016    Per Holter 9/18/15      Generalized osteoarthrosis, unspecified site     hands, L hip     Leiomyoma of uterus, unspecified      S/P AV conner ablation     At [facility], remote history      Unspecified arthropathy, pelvic region and thigh     L hip     Urethral stricture unspecified     inactive       PAST SURGICAL HISTORY:  Past Surgical History:   Procedure Laterality Date     C NONSPECIFIC PROCEDURE      numerous breast aspirations     C NONSPECIFIC PROCEDURE      T&A     C NONSPECIFIC PROCEDURE      left breast bx     C NONSPECIFIC PROCEDURE      numerous breast aspirations     C NONSPECIFIC PROCEDURE      tubal lig (remote)     COLONOSCOPY N/A 8/15/2017    Procedure: COMBINED COLONOSCOPY, SINGLE OR MULTIPLE BIOPSY/POLYPECTOMY BY BIOPSY;  COLONOSCOPY WITH POLYPECTOMY USING COLD FORCEPS;  Surgeon: Leonarda Gaona MD;  Location:  GI     s/p AV conner ablation  2007    At [facility], remote history      wisdom teeth         FAMILY HISTORY:  Family History   Problem Relation Age of Onset     Arthritis Mother      Neurologic Disorder Mother         parkinsons     Hypertension Mother      Hypertension Father      Cerebrovascular Disease Father         2     Heart Disease Father         mi x 2     Gastrointestinal Disease Father         bleeding ulcers     Cancer Father          prostate     Hypertension Brother      Breast Cancer Maternal Grandmother        SOCIAL HISTORY:  Social History     Socioeconomic History     Marital status:      Spouse name: Jere     Number of children: 3     Years of education: None     Highest education level: None   Occupational History     Occupation: volunteer     Comment: president of  auxiliary   Social Needs     Financial resource strain: None     Food insecurity:     Worry: None     Inability: None     Transportation needs:     Medical: None     Non-medical: None   Tobacco Use     Smoking status: Former Smoker     Smokeless tobacco: Never Used   Substance and Sexual Activity     Alcohol use: Yes     Alcohol/week: 0.0 oz     Comment: 1 glass wine per day     Drug use: No     Sexual activity: Yes     Partners: Male     Birth control/protection: Post-menopausal   Lifestyle     Physical activity:     Days per week: None     Minutes per session: None     Stress: None   Relationships     Social connections:     Talks on phone: None     Gets together: None     Attends Yazdanism service: None     Active member of club or organization: None     Attends meetings of clubs or organizations: None     Relationship status: None     Intimate partner violence:     Fear of current or ex partner: None     Emotionally abused: None     Physically abused: None     Forced sexual activity: None   Other Topics Concern     Parent/sibling w/ CABG, MI or angioplasty before 65F 55M? Not Asked      Service Not Asked     Blood Transfusions Not Asked     Caffeine Concern No     Comment: none     Occupational Exposure Not Asked     Hobby Hazards Not Asked     Sleep Concern Not Asked     Stress Concern Not Asked     Weight Concern Not Asked     Special Diet Not Asked     Back Care Not Asked     Exercise Yes     Comment: 4-5 a week. bike ,walk     Bike Helmet Not Asked     Seat Belt Not Asked     Self-Exams Not Asked   Social History Narrative     None  "      Review of Systems:  Skin:  Negative       Eyes:  Positive for glasses    ENT:  Negative      Respiratory:  Negative       Cardiovascular:  Negative      Gastroenterology: Negative      Genitourinary:  Negative      Musculoskeletal:  Positive for arthritis    Neurologic:  Negative      Psychiatric:  Negative      Heme/Lymph/Imm:  Negative      Endocrine:  Positive for night sweats occ    Physical Exam:  Vitals: /78   Pulse 56   Ht 1.581 m (5' 2.24\")   Wt 59 kg (130 lb)   BMI 23.59 kg/m       Constitutional:  cooperative, alert and oriented, well developed, well nourished, in no acute distress        Skin:  warm and dry to the touch, no apparent skin lesions or masses noted          Head:  normocephalic, no masses or lesions        Eyes:           Lymph:      ENT:  no pallor or cyanosis, dentition good        Neck:           Respiratory:  normal breath sounds, clear to auscultation, normal A-P diameter, normal symmetry, normal respiratory excursion, no use of accessory muscles         Cardiac: regular rhythm, normal S1/S2, no S3 or S4, apical impulse not displaced, no murmurs, gallops or rubs                pulses full and equal, no bruits auscultated                                        GI:  abdomen soft, non-tender, BS normoactive, no mass, no HSM, no bruits        Extremities and Muscular Skeletal:  no deformities, clubbing, cyanosis, erythema observed;no edema              Neurological:           Psych:           CC  No referring provider defined for this encounter.                Thank you for allowing me to participate in the care of your patient.      Sincerely,     DIAZ GONZALEZ MD     [facility]    cc:   No referring provider defined for this encounter.        "

## 2019-07-08 NOTE — PROGRESS NOTES
Service Date: 07/08/2019      HISTORY OF PRESENT ILLNESS:  I had the opportunity to see Ms. Mariluz Echeverria in Cardiology Clinic today at [facility] in [place] for reevaluation of paroxysmal atrial fibrillation.  Ms. Echeverria is a 78-year-old woman with a history of brief episodes of atrial fibrillation treated with ablation of atrial fibrillation at [facility] about 10 years ago or so.  Initially the ablation was effective and she was able to discontinue warfarin and flecainide.  She then had a few episodes of recurrent atrial fibrillation several years later, in 2011, and came to see me.  I elected to restart her flecainide at 50 mg p.o. b.i.d.  Initially she was on diltiazem for AV conner blockade, but that caused constipation and was intolerable.  We switched to metoprolol XL, and she has done well with that.  Her blood pressure has been excellent and heart rates have been good.  She has not felt any recurrences of atrial fibrillation for at least 7-1/2 years.  She was quite symptomatic with her atrial fibrillation episodes in the past, although was not on metoprolol at those times.      Otherwise, she seems to be doing very well.  She is remaining active within the limits of her usual aches and pains.  She has no shortness of breath, chest discomfort or other concerning cardiac symptoms.      We did a 12-lead ECG today which shows a minor degree of nonspecific intraventricular conduction delay, which is stable since last year, and mild nonspecific inferior and lateral T-wave abnormalities.      PHYSICAL EXAMINATION:  On examination today her blood pressure is 125/78, heart rate 56 and weight 130 pounds and stable.  Her lungs are clear.  Heart rhythm is regular.  She has no cardiac murmurs or carotid bruits.      IMPRESSIONS:  Ms. Mariluz Echeverria is a 78-year-old woman with paroxysmal atrial fibrillation which continues to be very well controlled on flecainide and metoprolol.   She was highly symptomatic with her episodes in the past, although I warned her that she might not be as symptomatic while on metoprolol.  She is not on anticoagulation.  We have discussed that, and she is quite aware of the risk of stroke with atrial fibrillation, but since she has not had any atrial fibrillation for many years it seems that the risk of bleeding from anticoagulation outweighs the small risk of stroke.  She will contact me immediately if she has any recurrent AFib issues and we can restart her on anticoagulation at that time, but for now she will try to remain vigilant.  Her blood pressure is under excellent control.  I will not make any medication changes.  I will plan to see her back again in 1 year.      Diaz Park MD, FACC       cc:   Yohana Falk MD    [facility]    [address], [address]    [place]  [address]         DIAZ PARK MD, FACC             D: 2019   T: 2019   MT: GRISELDA      Name:     MELANIA MORENO   MRN:      [id number]        Account:      [id number]   :      1940           Service Date: 2019      Document: [id number]

## 2019-07-08 NOTE — LETTER
7/8/2019      Yohana Falk MD  [address] [facility] [address]  [facility] [id number]      RE: Mariluz Echeverria       Dear Colleague,    I had the pleasure of seeing Mariluz Echeverria in the [facility].    Service Date: 07/08/2019      HISTORY OF PRESENT ILLNESS:  I had the opportunity to see Ms. Mariluz Echeverria in Cardiology Clinic today at [facility] in [place] for reevaluation of paroxysmal atrial fibrillation.  Ms. Echeverria is a 78-year-old woman with a history of brief episodes of atrial fibrillation treated with ablation of atrial fibrillation at  about 10 years ago or so.  Initially the ablation was effective and she was able to discontinue warfarin and flecainide.  She then had a few episodes of recurrent atrial fibrillation several years later, in 2011, and came to see me.  I elected to restart her flecainide at 50 mg p.o. b.i.d.  Initially she was on diltiazem for AV conner blockade, but that caused constipation and was intolerable.  We switched to metoprolol XL, and she has done well with that.  Her blood pressure has been excellent and heart rates have been good.  She has not felt any recurrences of atrial fibrillation for at least 7-1/2 years.  She was quite symptomatic with her atrial fibrillation episodes in the past, although was not on metoprolol at those times.      Otherwise, she seems to be doing very well.  She is remaining active within the limits of her usual aches and pains.  She has no shortness of breath, chest discomfort or other concerning cardiac symptoms.      We did a 12-lead ECG today which shows a minor degree of nonspecific intraventricular conduction delay, which is stable since last year, and mild nonspecific inferior and lateral T-wave abnormalities.      PHYSICAL EXAMINATION:  On examination today her blood pressure is 125/78, heart rate 56 and weight 130 pounds and stable.  Her lungs are clear.  Heart  rhythm is regular.  She has no cardiac murmurs or carotid bruits.      IMPRESSIONS:  Ms. Mariluz Moreno is a 78-year-old woman with paroxysmal atrial fibrillation which continues to be very well controlled on flecainide and metoprolol.  She was highly symptomatic with her episodes in the past, although I warned her that she might not be as symptomatic while on metoprolol.  She is not on anticoagulation.  We have discussed that, and she is quite aware of the risk of stroke with atrial fibrillation, but since she has not had any atrial fibrillation for many years it seems that the risk of bleeding from anticoagulation outweighs the small risk of stroke.  She will contact me immediately if she has any recurrent AFib issues and we can restart her on anticoagulation at that time, but for now she will try to remain vigilant.  Her blood pressure is under excellent control.  I will not make any medication changes.  I will plan to see her back again in 1 year.      Diaz Gonzalez MD, FACC       cc:   Yohana Falk MD    [facility]    [address], [address]    [place] [address]         DIAZ GONZALEZ MD, FACC             D: 2019   T: 2019   MT: GRISELDA      Name:     MARILUZ MORENO   MRN:      [id number]        Account:      [id number]   :      1940           Service Date: 2019      Document: [id number]         Outpatient Encounter Medications as of 2019   Medication Sig Dispense Refill     amLODIPine (NORVASC) 5 MG tablet Take one 5 MG pill by mouth in the morning 90 tablet 3     Calcium Carbonate-Vitamin D (CALCIUM 600 + D OR) Take 1 tablet by mouth daily       flecainide (TAMBOCOR) 50 MG tablet Take 1 tablet (50 mg) by mouth 2 times daily 180 tablet 3     lisinopril-hydrochlorothiazide (PRINZIDE/ZESTORETIC) 20-12.5 MG tablet Take 1 tablet by mouth daily 90 tablet 3     metoprolol succinate ER (TOPROL-XL) 50 MG 24 hr tablet Take 1 tablet (50 mg) by mouth daily 90 tablet 3      Multiple Vitamins-Minerals (PRESERVISION/LUTEIN PO) Take 2 capsules by mouth daily       [DISCONTINUED] amLODIPine (NORVASC) 5 MG tablet Take one 5 MG pill by mouth in the morning 90 tablet 3     [DISCONTINUED] flecainide (TAMBOCOR) 50 MG tablet Take 1 tablet (50 mg) by mouth 2 times daily 180 tablet 1     [DISCONTINUED] lisinopril-hydrochlorothiazide (PRINZIDE/ZESTORETIC) 20-12.5 MG per tablet Take 1 tablet by mouth daily 90 tablet 3     [DISCONTINUED] metoprolol succinate ER (TOPROL-XL) 50 MG 24 hr tablet Take 1 tablet (50 mg) by mouth daily 90 tablet 0     No facility-administered encounter medications on file as of 7/8/2019.        Again, thank you for allowing me to participate in the care of your patient.      Sincerely,    DIAZ GONZALEZ MD     [facility]

## 2019-09-16 ENCOUNTER — TRANSFERRED RECORDS (OUTPATIENT)
Dept: HEALTH INFORMATION MANAGEMENT | Facility: CLINIC | Age: 79
End: 2019-09-16

## 2019-09-20 ENCOUNTER — OFFICE VISIT (OUTPATIENT)
Dept: FAMILY MEDICINE | Facility: CLINIC | Age: 79
End: 2019-09-20
Payer: COMMERCIAL

## 2019-09-20 VITALS
RESPIRATION RATE: 14 BRPM | WEIGHT: 129 LBS | TEMPERATURE: 98.5 F | HEIGHT: 63 IN | DIASTOLIC BLOOD PRESSURE: 77 MMHG | HEART RATE: 66 BPM | BODY MASS INDEX: 22.86 KG/M2 | SYSTOLIC BLOOD PRESSURE: 132 MMHG

## 2019-09-20 DIAGNOSIS — R79.89 OTHER SPECIFIED ABNORMAL FINDINGS OF BLOOD CHEMISTRY: ICD-10-CM

## 2019-09-20 DIAGNOSIS — I10 ESSENTIAL HYPERTENSION, BENIGN: ICD-10-CM

## 2019-09-20 DIAGNOSIS — G58.9 MONONEUROPATHY: Primary | ICD-10-CM

## 2019-09-20 LAB
HBA1C MFR BLD: 5.1 % (ref 0–5.6)
VIT B12 SERPL-MCNC: 508 PG/ML (ref 193–986)

## 2019-09-20 PROCEDURE — 36415 COLL VENOUS BLD VENIPUNCTURE: CPT | Performed by: FAMILY MEDICINE

## 2019-09-20 PROCEDURE — 83036 HEMOGLOBIN GLYCOSYLATED A1C: CPT | Performed by: FAMILY MEDICINE

## 2019-09-20 PROCEDURE — 82607 VITAMIN B-12: CPT | Performed by: FAMILY MEDICINE

## 2019-09-20 PROCEDURE — 99214 OFFICE O/P EST MOD 30 MIN: CPT | Performed by: FAMILY MEDICINE

## 2019-09-20 RX ORDER — LIDOCAINE 40 MG/G
1 CREAM TOPICAL
Qty: 45 G | Refills: 1 | Status: SHIPPED | OUTPATIENT
Start: 2019-09-20

## 2019-09-20 RX ORDER — LIDOCAINE 50 MG/G
OINTMENT TOPICAL PRN
Qty: 30 G | Refills: 1 | Status: SHIPPED | OUTPATIENT
Start: 2019-09-20 | End: 2019-09-20

## 2019-09-20 ASSESSMENT — MIFFLIN-ST. JEOR: SCORE: 1026.33

## 2019-09-20 NOTE — PROGRESS NOTES
Subjective     Mariluz Echeverria is a 79 year old female who presents to clinic today for the following health issues:    HPI     Reports right great toe pain for approximately past 3 months.  No trauma leading to this.  No compressive footwear, typically wearing tennis shoes or sandals.  Has been opting for 10 issues more recently due to left foot plantar fasciitis.  Feels like she may have been favoring her right foot due to the pain of her left.    Reports the pain is the bottom of the right great toe.  Feels like a raw sensation, describes it as a blister.  Has not noticed any changes with the skin.  Had a dermatologist take a look as well, no significant findings.  She finds it is helpful to put a bandage over the toe to relieve some of the pain.    No history of similar.  Reports one serving of red meat once weekly or every other week.  No history of abnormal blood sugars.      Patient Active Problem List   Diagnosis     Arthropathy of pelvic region and thigh     Diffuse cystic mastopathy     Urethral stricture     Essential hypertension, benign     Postmenopausal atrophic vaginitis     CARDIOVASCULAR SCREENING; LDL GOAL LESS THAN 130     Advanced directives, counseling/discussion     Urinary retention     First degree AV block     S/P AV conner ablation     Paroxysmal atrial fibrillation (H)     Past Surgical History:   Procedure Laterality Date     C NONSPECIFIC PROCEDURE      numerous breast aspirations     C NONSPECIFIC PROCEDURE      T&A     C NONSPECIFIC PROCEDURE      left breast bx     C NONSPECIFIC PROCEDURE      numerous breast aspirations     C NONSPECIFIC PROCEDURE      tubal lig (remote)     COLONOSCOPY N/A 8/15/2017    Procedure: COMBINED COLONOSCOPY, SINGLE OR MULTIPLE BIOPSY/POLYPECTOMY BY BIOPSY;  COLONOSCOPY WITH POLYPECTOMY USING COLD FORCEPS;  Surgeon: Leonarda Gaona MD;  Location:  GI     s/p AV conner ablation  2007    At [facility], remote history      wisdom teeth         Social  "History     Tobacco Use     Smoking status: Former Smoker     Smokeless tobacco: Never Used   Substance Use Topics     Alcohol use: Yes     Alcohol/week: 0.0 oz     Comment: 1 glass wine per day     Family History   Problem Relation Age of Onset     Arthritis Mother      Neurologic Disorder Mother         parkinsons     Hypertension Mother      Hypertension Father      Cerebrovascular Disease Father         2     Heart Disease Father         mi x 2     Gastrointestinal Disease Father         bleeding ulcers     Cancer Father         prostate     Hypertension Brother      Breast Cancer Maternal Grandmother          Reviewed and updated as needed this visit by Provider  Tobacco  Allergies  Meds  Problems  Med Hx  Surg Hx  Fam Hx         Review of Systems   ROS COMP: Constitutional, HEENT, cardiovascular, pulmonary, gi and gu systems are negative, except as otherwise noted.      Objective    /77 (BP Location: Right arm, Patient Position: Sitting, Cuff Size: Adult Regular)   Pulse 66   Temp 98.5  F (36.9  C) (Oral)   Resp 14   Ht 1.588 m (5' 2.5\")   Wt 58.5 kg (129 lb)   Breastfeeding? No   BMI 23.22 kg/m    Body mass index is 23.22 kg/m .  Physical Exam   GENERAL: healthy, alert and no distress  MS: No swelling of the right great toe, no erythema or skin lesions appreciated. Normal range of motion.  NEURO: Decreased sensation of the right lateral and plantar great toe compared to the left, normal strength    Diagnostic Test Results:  Labs reviewed in Epic        Assessment & Plan     1. Mononeuropathy -discussed likely diagnosis and possible causes.  Will rule out nutritional and hormonal imbalance.  Thyroid levels were normal this spring, so no need to test.  Discussed that she may have been favoring her right foot to account for the pain of her plantar fasciitis of the left foot.  This may have ended in compression of the superficial nerves of the toe.  Can trial topical lidocaine to see if this " offers her pain relief. Advised comfortable foot wear.  - lidocaine 4% cream; Apply topically every 3 hours for pain  Dispense: 30 g; Refill: 1  - Vitamin B12  - Hemoglobin A1c    2. Other specified abnormal findings of blood chemistry   - Hemoglobin A1c     3. Essential hypertension, benign - in range, continue current      Return in about 2 months (around 11/20/2019) for if symptoms fail to improve or worsen.    Zoe Malhotra MD  [facility]

## 2019-09-25 ENCOUNTER — TELEPHONE (OUTPATIENT)
Dept: FAMILY MEDICINE | Facility: CLINIC | Age: 79
End: 2019-09-25

## 2019-09-25 NOTE — TELEPHONE ENCOUNTER
----- Message from Zoe Malhotra MD sent at 9/24/2019  5:05 PM CDT -----  Please call -both vitamin B12 and diabetes screens were normal.

## 2020-06-09 ENCOUNTER — TRANSFERRED RECORDS (OUTPATIENT)
Dept: HEALTH INFORMATION MANAGEMENT | Facility: CLINIC | Age: 80
End: 2020-06-09

## 2020-06-15 ENCOUNTER — HOSPITAL ENCOUNTER (OUTPATIENT)
Dept: MRI IMAGING | Facility: CLINIC | Age: 80
Discharge: HOME | End: 2020-06-15
Attending: PSYCHIATRY & NEUROLOGY | Admitting: PSYCHIATRY & NEUROLOGY
Payer: COMMERCIAL

## 2020-06-15 DIAGNOSIS — M54.16 LUMBAR RADICULOPATHY: ICD-10-CM

## 2020-06-15 PROCEDURE — 72148 MRI LUMBAR SPINE W/O DYE: CPT

## 2020-06-24 ENCOUNTER — VIRTUAL VISIT (OUTPATIENT)
Dept: CARDIOLOGY | Facility: CLINIC | Age: 80
End: 2020-06-24
Attending: INTERNAL MEDICINE
Payer: COMMERCIAL

## 2020-06-24 VITALS
WEIGHT: 130 LBS | DIASTOLIC BLOOD PRESSURE: 73 MMHG | HEIGHT: 63 IN | HEART RATE: 62 BPM | BODY MASS INDEX: 23.04 KG/M2 | SYSTOLIC BLOOD PRESSURE: 124 MMHG

## 2020-06-24 DIAGNOSIS — I10 BENIGN ESSENTIAL HYPERTENSION: ICD-10-CM

## 2020-06-24 DIAGNOSIS — I48.0 PAROXYSMAL ATRIAL FIBRILLATION (H): Primary | ICD-10-CM

## 2020-06-24 PROCEDURE — 99214 OFFICE O/P EST MOD 30 MIN: CPT | Mod: 95 | Performed by: INTERNAL MEDICINE

## 2020-06-24 ASSESSMENT — MIFFLIN-ST. JEOR: SCORE: 1034.84

## 2020-06-24 NOTE — PROGRESS NOTES
"Mariluz Echeverria is a 79 year old female who is being evaluated via a billable video visit.      The patient has been notified of following:     \"This video visit will be conducted via a call between you and your physician/provider. We have found that certain health care needs can be provided without the need for an in-person physical exam.  This service lets us provide the care you need with a video conversation.  If a prescription is necessary we can send it directly to your pharmacy.  If lab work is needed we can place an order for that and you can then stop by our lab to have the test done at a later time.    Video visits are billed at different rates depending on your insurance coverage.  Please reach out to your insurance provider with any questions.    If during the course of the call the physician/provider feels a video visit is not appropriate, you will not be charged for this service.\"    Patient has given verbal consent for Video visit? Yes    How would you like to obtain your AVS? Mail a copy  Patient would like the video invitation sent by: Text to cell phone: [phone]    Will anyone else be joining your video visit? No      Bp:124/73  Pulse:62  Wt:130.0lb    Review Of Systems  Skin: NEGATIVE  Eyes:Ears/Nose/Throat: Glasses  Respiratory: NEGATIVE  Cardiovascular:NEGATIVE  Gastrointestinal: NEGATIVE  Genitourinary:NEGATIVE   Musculoskeletal: Arthritis in hands,toes,hips  Neurologic: NEGATIVE  Psychiatric: NEGATIVE  Hematologic/Lymphatic/Immunologic: NEGATIVE  Endocrine:  NEGATIVE    Telephone number of patient: [phone]    Jennifer Chu [facility]    Video-Visit Details    Type of service:  Video Visit    Video Start Time: 3:41 PM  Video End Time: 4:01 PM    Originating Location (pt. Location): Home    Distant Location (provider location):  [facility]     Platform used for Video Visit: DIAZ Smith MD    HPI and Plan:   See dictation    Orders " Placed This Encounter   Procedures     Follow-Up with Cardiologist     No orders of the defined types were placed in this encounter.    There are no discontinued medications.      Encounter Diagnoses   Name Primary?     Paroxysmal atrial fibrillation (H) Yes     Benign essential hypertension        CURRENT MEDICATIONS:  Current Outpatient Medications   Medication Sig Dispense Refill     amLODIPine (NORVASC) 5 MG tablet Take one 5 MG pill by mouth in the morning 90 tablet 3     Calcium Carbonate-Vitamin D (CALCIUM 600 + D OR) Take 1 tablet by mouth daily       flecainide (TAMBOCOR) 50 MG tablet Take 1 tablet (50 mg) by mouth 2 times daily 180 tablet 3     lidocaine (LMX4) 4 % external cream Apply 1 g topically every 3 hours as needed for pain (of the right great toe) Cancel previous script 45 g 1     lisinopril-hydrochlorothiazide (PRINZIDE/ZESTORETIC) 20-12.5 MG tablet Take 1 tablet by mouth daily 90 tablet 3     metoprolol succinate ER (TOPROL-XL) 50 MG 24 hr tablet Take 1 tablet (50 mg) by mouth daily 90 tablet 3     Multiple Vitamins-Minerals (PRESERVISION/LUTEIN PO) Take 2 capsules by mouth daily         ALLERGIES     Allergies   Allergen Reactions     Meperidine Hcl      demerol= nausea       PAST MEDICAL HISTORY:  Past Medical History:   Diagnosis Date     Actinic keratosis     multiple, neris on back      Atrial fibrillation (H)     ablation @ [facility] 1/4/07     Diffuse cystic mastopathy     has prominent chronic L breast cystic structure     Essential hypertension, benign      First degree AV block 5/9/2016    Per Holter 9/18/15      Generalized osteoarthrosis, unspecified site     hands, L hip     Leiomyoma of uterus, unspecified      S/P AV conner ablation     At [facility], remote history      Unspecified arthropathy, pelvic region and thigh     L hip     Urethral stricture unspecified     inactive       PAST SURGICAL HISTORY:  Past Surgical History:   Procedure Laterality Date     C NONSPECIFIC PROCEDURE       numerous breast aspirations     C NONSPECIFIC PROCEDURE      T&A     C NONSPECIFIC PROCEDURE      left breast bx     C NONSPECIFIC PROCEDURE      numerous breast aspirations     C NONSPECIFIC PROCEDURE      tubal lig (remote)     COLONOSCOPY N/A 8/15/2017    Procedure: COMBINED COLONOSCOPY, SINGLE OR MULTIPLE BIOPSY/POLYPECTOMY BY BIOPSY;  COLONOSCOPY WITH POLYPECTOMY USING COLD FORCEPS;  Surgeon: Leonarda Gaona MD;  Location:  GI     s/p AV conner ablation  2007    At [facility], remote history      wisdom teeth         FAMILY HISTORY:  Family History   Problem Relation Age of Onset     Arthritis Mother      Neurologic Disorder Mother         parkinsons     Hypertension Mother      Hypertension Father      Cerebrovascular Disease Father         2     Heart Disease Father         mi x 2     Gastrointestinal Disease Father         bleeding ulcers     Cancer Father         prostate     Hypertension Brother      Breast Cancer Maternal Grandmother        SOCIAL HISTORY:  Social History     Socioeconomic History     Marital status:      Spouse name: Jere     Number of children: 3     Years of education: Not on file     Highest education level: Not on file   Occupational History     Occupation: volunteer     Comment: president of  auxiliary   Social Needs     Financial resource strain: Not on file     Food insecurity     Worry: Not on file     Inability: Not on file     Transportation needs     Medical: Not on file     Non-medical: Not on file   Tobacco Use     Smoking status: Former Smoker     Smokeless tobacco: Never Used   Substance and Sexual Activity     Alcohol use: Yes     Alcohol/week: 0.0 standard drinks     Comment: 1 glass wine per day     Drug use: No     Sexual activity: Yes     Partners: Male     Birth control/protection: Post-menopausal   Lifestyle     Physical activity     Days per week: Not on file     Minutes per session: Not on file     Stress: Not on file   Relationships     Social  "connections     Talks on phone: Not on file     Gets together: Not on file     Attends Restorationist service: Not on file     Active member of club or organization: Not on file     Attends meetings of clubs or organizations: Not on file     Relationship status: Not on file     Intimate partner violence     Fear of current or ex partner: Not on file     Emotionally abused: Not on file     Physically abused: Not on file     Forced sexual activity: Not on file   Other Topics Concern     Parent/sibling w/ CABG, MI or angioplasty before 65F 55M? Not Asked      Service Not Asked     Blood Transfusions Not Asked     Caffeine Concern No     Comment: none     Occupational Exposure Not Asked     Hobby Hazards Not Asked     Sleep Concern Not Asked     Stress Concern Not Asked     Weight Concern Not Asked     Special Diet Not Asked     Back Care Not Asked     Exercise Yes     Comment: 4-5 a week. bike ,walk     Bike Helmet Not Asked     Seat Belt Not Asked     Self-Exams Not Asked   Social History Narrative     Not on file       Physical Exam:  Vitals: /73   Pulse 62   Ht 1.594 m (5' 2.75\")   Wt 59 kg (130 lb)   BMI 23.21 kg/m      General:  no apparent distress, normal body habitus, sitting upright.  ENT/Mouth:  membranes moist, no nasal discharge.  Normal head shape, no apparent injury or laceration.  Eyes:  no scleral icterus, normal conjunctivae.  No observed jaundice.  Neck:  no apparent neck swelling.   Chest/Lungs:  No breathing difficulty while speaking.  No audible wheezing.  No cough during conversation.  Cardiovascular:  No obviously elevated jugular venous pressure.  No apparent edema bilaterally in LE.   Abdomen:  no obvious abdominal distention.   Extremities:  no apparent cyanosis.  Skin:  no xanthelasma.  No facial lacerations.  Neurologic:  Normal arm motion bilateral, no tremors.    Psychiatric:  Alert and oriented x3, calm demeanor    The rest of the comprehensive physical examination is " deferred due to public health emergency video visit restrictions.        Recent Lab Results:  LIPID RESULTS:  Lab Results   Component Value Date    CHOL 245 (H) 05/15/2019     05/15/2019     (H) 05/15/2019    TRIG 42 05/15/2019    CHOLHDLRATIO 2.3 05/13/2014       LIVER ENZYME RESULTS:  Lab Results   Component Value Date    AST 20 05/15/2019    ALT 22 05/15/2019       CBC RESULTS:  Lab Results   Component Value Date    WBC 6.9 05/15/2019    RBC 4.43 05/15/2019    HGB 13.6 05/15/2019    HCT 41.2 05/15/2019    MCV 93 05/15/2019    MCH 30.7 05/15/2019    MCHC 33.0 05/15/2019    RDW 14.1 05/15/2019     05/15/2019       BMP RESULTS:  Lab Results   Component Value Date     05/15/2019    POTASSIUM 3.6 05/15/2019    CHLORIDE 99 05/15/2019    CO2 27 05/15/2019    ANIONGAP 12 05/15/2019    GLC 82 05/15/2019    BUN 18 05/15/2019    CR 0.86 05/15/2019    GFRESTIMATED 65 05/15/2019    GFRESTBLACK 75 05/15/2019    KRIS 9.6 05/15/2019        A1C RESULTS:  Lab Results   Component Value Date    A1C 5.1 09/20/2019       INR RESULTS:  Lab Results   Component Value Date    INR 2.5 03/02/2007    INR 2.8 02/13/2007    INR 1.5@ 09/05/2006           CC  Ivan Park MD  [address]  [place] [id number]

## 2020-06-24 NOTE — LETTER
6/24/2020    Yohana Falk MD  [address]  [facility] [address]    RE: Mariluz Echeverria       Dear Colleague,    I had the pleasure of seeing Mariluz Echeverria in the [facility].    Mariluz Echeverria is a 79 year old female who is being evaluated via a billable video visit.      HPI and Plan:   See dictation    Orders Placed This Encounter   Procedures     Follow-Up with Cardiologist     No orders of the defined types were placed in this encounter.    There are no discontinued medications.      Encounter Diagnoses   Name Primary?     Paroxysmal atrial fibrillation (H) Yes     Benign essential hypertension        CURRENT MEDICATIONS:  Current Outpatient Medications   Medication Sig Dispense Refill     amLODIPine (NORVASC) 5 MG tablet Take one 5 MG pill by mouth in the morning 90 tablet 3     Calcium Carbonate-Vitamin D (CALCIUM 600 + D OR) Take 1 tablet by mouth daily       flecainide (TAMBOCOR) 50 MG tablet Take 1 tablet (50 mg) by mouth 2 times daily 180 tablet 3     lidocaine (LMX4) 4 % external cream Apply 1 g topically every 3 hours as needed for pain (of the right great toe) Cancel previous script 45 g 1     lisinopril-hydrochlorothiazide (PRINZIDE/ZESTORETIC) 20-12.5 MG tablet Take 1 tablet by mouth daily 90 tablet 3     metoprolol succinate ER (TOPROL-XL) 50 MG 24 hr tablet Take 1 tablet (50 mg) by mouth daily 90 tablet 3     Multiple Vitamins-Minerals (PRESERVISION/LUTEIN PO) Take 2 capsules by mouth daily         ALLERGIES     Allergies   Allergen Reactions     Meperidine Hcl      demerol= nausea       PAST MEDICAL HISTORY:  Past Medical History:   Diagnosis Date     Actinic keratosis     multiple, neris on back      Atrial fibrillation (H)     ablation @ [facility] 1/4/07     Diffuse cystic mastopathy     has prominent chronic L breast cystic structure     Essential hypertension, benign      First degree AV block 5/9/2016    Per Holter 9/18/15      Generalized  osteoarthrosis, unspecified site     hands, L hip     Leiomyoma of uterus, unspecified      S/P AV conner ablation     At [facility], remote history      Unspecified arthropathy, pelvic region and thigh     L hip     Urethral stricture unspecified     inactive       PAST SURGICAL HISTORY:  Past Surgical History:   Procedure Laterality Date     C NONSPECIFIC PROCEDURE      numerous breast aspirations     C NONSPECIFIC PROCEDURE      T&A     C NONSPECIFIC PROCEDURE      left breast bx     C NONSPECIFIC PROCEDURE      numerous breast aspirations     C NONSPECIFIC PROCEDURE      tubal lig (remote)     COLONOSCOPY N/A 8/15/2017    Procedure: COMBINED COLONOSCOPY, SINGLE OR MULTIPLE BIOPSY/POLYPECTOMY BY BIOPSY;  COLONOSCOPY WITH POLYPECTOMY USING COLD FORCEPS;  Surgeon: Leonarda Gaona MD;  Location:  GI     s/p AV conner ablation  2007    At [facility], remote history      wisdom teeth         FAMILY HISTORY:  Family History   Problem Relation Age of Onset     Arthritis Mother      Neurologic Disorder Mother         parkinsons     Hypertension Mother      Hypertension Father      Cerebrovascular Disease Father         2     Heart Disease Father         mi x 2     Gastrointestinal Disease Father         bleeding ulcers     Cancer Father         prostate     Hypertension Brother      Breast Cancer Maternal Grandmother        SOCIAL HISTORY:  Social History     Socioeconomic History     Marital status:      Spouse name: Jere     Number of children: 3     Years of education: Not on file     Highest education level: Not on file   Occupational History     Occupation: volunteer     Comment: president of  auxiliary   Social Needs     Financial resource strain: Not on file     Food insecurity     Worry: Not on file     Inability: Not on file     Transportation needs     Medical: Not on file     Non-medical: Not on file   Tobacco Use     Smoking status: Former Smoker     Smokeless tobacco: Never Used   Substance  "and Sexual Activity     Alcohol use: Yes     Alcohol/week: 0.0 standard drinks     Comment: 1 glass wine per day     Drug use: No     Sexual activity: Yes     Partners: Male     Birth control/protection: Post-menopausal   Lifestyle     Physical activity     Days per week: Not on file     Minutes per session: Not on file     Stress: Not on file   Relationships     Social connections     Talks on phone: Not on file     Gets together: Not on file     Attends Christianity service: Not on file     Active member of club or organization: Not on file     Attends meetings of clubs or organizations: Not on file     Relationship status: Not on file     Intimate partner violence     Fear of current or ex partner: Not on file     Emotionally abused: Not on file     Physically abused: Not on file     Forced sexual activity: Not on file   Other Topics Concern     Parent/sibling w/ CABG, MI or angioplasty before 65F 55M? Not Asked      Service Not Asked     Blood Transfusions Not Asked     Caffeine Concern No     Comment: none     Occupational Exposure Not Asked     Hobby Hazards Not Asked     Sleep Concern Not Asked     Stress Concern Not Asked     Weight Concern Not Asked     Special Diet Not Asked     Back Care Not Asked     Exercise Yes     Comment: 4-5 a week. bike ,walk     Bike Helmet Not Asked     Seat Belt Not Asked     Self-Exams Not Asked   Social History Narrative     Not on file       Physical Exam:  Vitals: /73   Pulse 62   Ht 1.594 m (5' 2.75\")   Wt 59 kg (130 lb)   BMI 23.21 kg/m      General:  no apparent distress, normal body habitus, sitting upright.  ENT/Mouth:  membranes moist, no nasal discharge.  Normal head shape, no apparent injury or laceration.  Eyes:  no scleral icterus, normal conjunctivae.  No observed jaundice.  Neck:  no apparent neck swelling.   Chest/Lungs:  No breathing difficulty while speaking.  No audible wheezing.  No cough during conversation.  Cardiovascular:  No obviously " elevated jugular venous pressure.  No apparent edema bilaterally in LE.   Abdomen:  no obvious abdominal distention.   Extremities:  no apparent cyanosis.  Skin:  no xanthelasma.  No facial lacerations.  Neurologic:  Normal arm motion bilateral, no tremors.    Psychiatric:  Alert and oriented x3, calm demeanor    The rest of the comprehensive physical examination is deferred due to public health emergency video visit restrictions.        Recent Lab Results:  LIPID RESULTS:  Lab Results   Component Value Date    CHOL 245 (H) 05/15/2019     05/15/2019     (H) 05/15/2019    TRIG 42 05/15/2019    CHOLHDLRATIO 2.3 05/13/2014       LIVER ENZYME RESULTS:  Lab Results   Component Value Date    AST 20 05/15/2019    ALT 22 05/15/2019       CBC RESULTS:  Lab Results   Component Value Date    WBC 6.9 05/15/2019    RBC 4.43 05/15/2019    HGB 13.6 05/15/2019    HCT 41.2 05/15/2019    MCV 93 05/15/2019    MCH 30.7 05/15/2019    MCHC 33.0 05/15/2019    RDW 14.1 05/15/2019     05/15/2019       BMP RESULTS:  Lab Results   Component Value Date     05/15/2019    POTASSIUM 3.6 05/15/2019    CHLORIDE 99 05/15/2019    CO2 27 05/15/2019    ANIONGAP 12 05/15/2019    GLC 82 05/15/2019    BUN 18 05/15/2019    CR 0.86 05/15/2019    GFRESTIMATED 65 05/15/2019    GFRESTBLACK 75 05/15/2019    KRIS 9.6 05/15/2019        A1C RESULTS:  Lab Results   Component Value Date    A1C 5.1 09/20/2019       INR RESULTS:  Lab Results   Component Value Date    INR 2.5 03/02/2007    INR 2.8 02/13/2007    INR 1.5@ 09/05/2006       Service Date: 06/24/2020      VIDEO VISIT      HISTORY OF PRESENT ILLNESS:  I had the opportunity to see Ms. Mariluz Echeverria in Cardiology Clinic today at the [facility] in [place] for a video visit regarding paroxysmal atrial fibrillation and hypertension.  Ms. Echeverria is a 79-year-old woman who has a history of ablation of atrial fibrillation about 11 years ago at [facility]  [facility].  She subsequently had some recurrences of atrial fibrillation and started coming here for evaluation and treatment.  When I saw her in the past, I started her on flecainide 50 mg p.o. b.i.d. and she has not had any recurrences of atrial fibrillation that she has recognized for the last 8.5 years.  She has wished to defer anticoagulation because she has not had any recurrences of atrial fibrillation and does not want to take on the bleeding risk.      She has had some low back pain problems recently which has limited her ability to walk.  She is riding her bike or stationary exercise bike about 5 times a week and feels good.  She has had no palpitations, lightheadedness or syncope.  She has had no chest pain, pressure, tightness or discomfort in the left arm, neck, jaw, back or shoulder.  She denies shortness of breath.  She seems to be tolerating the flecainide without any side effects.      PHYSICAL EXAMINATION:  Today, her blood pressure was 124/73, heart rate 62 and weight 130 pounds.      IMPRESSIONS:  Ms. Mariluz Echeverria is a 79-year-old woman who has a history of paroxysmal atrial fibrillation treated with ablation about 11 years ago with a recurrence of atrial fibrillation several years after that requiring initiation of antiarrhythmic drug therapy.  She has been maintained on a low dose of flecainide 50 mg p.o. b.i.d. for the last 8 years or so and has done extremely well.  She has had no recognized recurrences of atrial fibrillation during that time.  Since I saw her last year, she has no new symptoms, except for some back pain problems.  She is pleased that she has not had any recognized atrial fibrillation and does not wish to initiate anticoagulation.  I warned her to contact us immediately if she has any recurrent palpitations or other signs of atrial fibrillation, so that we can get her started on anticoagulation for stroke prevention.  She understands this well.      I will plan to see her  back again in 1 year for reevaluation.     Thank you for allowing me to participate in the care of your patient.    Sincerely,     DIAZ GONZALEZ MD     [facility]

## 2020-06-24 NOTE — PROGRESS NOTES
Service Date: 06/24/2020      VIDEO VISIT      HISTORY OF PRESENT ILLNESS:  I had the opportunity to see Ms. Mariluz Echeverria in Cardiology Clinic today at the [facility] in [place] for a video visit regarding paroxysmal atrial fibrillation and hypertension.  Ms. Echeverria is a 79-year-old woman who has a history of ablation of atrial fibrillation about 11 years ago at [facility].  She subsequently had some recurrences of atrial fibrillation and started coming here for evaluation and treatment.  When I saw her in the past, I started her on flecainide 50 mg p.o. b.i.d. and she has not had any recurrences of atrial fibrillation that she has recognized for the last 8.5 years.  She has wished to defer anticoagulation because she has not had any recurrences of atrial fibrillation and does not want to take on the bleeding risk.      She has had some low back pain problems recently which has limited her ability to walk.  She is riding her bike or stationary exercise bike about 5 times a week and feels good.  She has had no palpitations, lightheadedness or syncope.  She has had no chest pain, pressure, tightness or discomfort in the left arm, neck, jaw, back or shoulder.  She denies shortness of breath.  She seems to be tolerating the flecainide without any side effects.      PHYSICAL EXAMINATION:  Today, her blood pressure was 124/73, heart rate 62 and weight 130 pounds.      IMPRESSIONS:  Ms. Mariluz Echeverria is a 79-year-old woman who has a history of paroxysmal atrial fibrillation treated with ablation about 11 years ago with a recurrence of atrial fibrillation several years after that requiring initiation of antiarrhythmic drug therapy.  She has been maintained on a low dose of flecainide 50 mg p.o. b.i.d. for the last 8 years or so and has done extremely well.  She has had no recognized recurrences of atrial fibrillation during that time.  Since I saw her last year, she has no new  symptoms, except for some back pain problems.  She is pleased that she has not had any recognized atrial fibrillation and does not wish to initiate anticoagulation.  I warned her to contact us immediately if she has any recurrent palpitations or other signs of atrial fibrillation, so that we can get her started on anticoagulation for stroke prevention.  She understands this well.      I will plan to see her back again in 1 year for reevaluation.      Ivan Park MD      cc:   Yohana Falk MD   [facility] [address] [place] [address]         IVAN PARK MD, [facility]             D: 2020   T: 2020   MT: al      Name:     MELANIA MORENO   MRN:      [id number]        Account:      [id number]   :      1940           Service Date: 2020      Document: [id number]

## 2020-06-26 ENCOUNTER — HOSPITAL ENCOUNTER (OUTPATIENT)
Dept: MAMMOGRAPHY | Facility: CLINIC | Age: 80
Discharge: HOME | End: 2020-06-26
Attending: INTERNAL MEDICINE | Admitting: INTERNAL MEDICINE
Payer: COMMERCIAL

## 2020-06-26 DIAGNOSIS — Z12.31 VISIT FOR SCREENING MAMMOGRAM: ICD-10-CM

## 2020-06-26 PROCEDURE — 77063 BREAST TOMOSYNTHESIS BI: CPT

## 2020-07-01 ENCOUNTER — TELEPHONE (OUTPATIENT)
Dept: INTERNAL MEDICINE | Facility: CLINIC | Age: 80
End: 2020-07-01

## 2020-07-01 DIAGNOSIS — M79.661 PAIN OF RIGHT LOWER LEG: Primary | ICD-10-CM

## 2020-07-01 NOTE — TELEPHONE ENCOUNTER
Patient has seen neurologist for leg and toe pain who is recommending a shot in her back to help with the pain. Then another shot if the first one does not help. Patient would like a second opinion or to speak with someone else before proceeding with procedure. Call her back to advise at [phone]

## 2020-07-02 NOTE — TELEPHONE ENCOUNTER
Please see message below.    Refer to a different neuro or different specialist?    Please advise, thanks.

## 2020-07-02 NOTE — TELEPHONE ENCOUNTER
Here are some options for a Neurology second opinion:    Consult at [facility]: Neurology Clinic - [place] [phone]   [email or web address]  General Neurology     [facility]: [facility] Neu[facility] [phone]   [email or web address]   [place] [phone]   [email or web address]     [facility]: [facility] [phone]   [email or web address]     Please advise patient of names/contact information.

## 2020-07-20 ENCOUNTER — VIRTUAL VISIT (OUTPATIENT)
Dept: INTERNAL MEDICINE | Facility: CLINIC | Age: 80
End: 2020-07-20
Payer: COMMERCIAL

## 2020-07-20 VITALS
DIASTOLIC BLOOD PRESSURE: 76 MMHG | TEMPERATURE: 97.6 F | BODY MASS INDEX: 23.03 KG/M2 | WEIGHT: 129 LBS | SYSTOLIC BLOOD PRESSURE: 123 MMHG

## 2020-07-20 DIAGNOSIS — I10 BENIGN ESSENTIAL HYPERTENSION: Primary | ICD-10-CM

## 2020-07-20 DIAGNOSIS — M48.061 FORAMINAL STENOSIS OF LUMBAR REGION: ICD-10-CM

## 2020-07-20 DIAGNOSIS — I48.0 PAROXYSMAL ATRIAL FIBRILLATION (H): ICD-10-CM

## 2020-07-20 PROCEDURE — 99214 OFFICE O/P EST MOD 30 MIN: CPT | Mod: 95 | Performed by: INTERNAL MEDICINE

## 2020-07-20 RX ORDER — AMLODIPINE BESYLATE 5 MG/1
TABLET ORAL
Qty: 90 TABLET | Refills: 3 | Status: SHIPPED | OUTPATIENT
Start: 2020-07-20 | End: 2021-07-23

## 2020-07-20 RX ORDER — LISINOPRIL AND HYDROCHLOROTHIAZIDE 12.5; 2 MG/1; MG/1
1 TABLET ORAL DAILY
Qty: 90 TABLET | Refills: 3 | Status: SHIPPED | OUTPATIENT
Start: 2020-07-20 | End: 2021-07-23

## 2020-07-20 RX ORDER — FLECAINIDE ACETATE 50 MG/1
50 TABLET ORAL 2 TIMES DAILY
Qty: 180 TABLET | Refills: 3 | Status: SHIPPED | OUTPATIENT
Start: 2020-07-20 | End: 2021-07-26

## 2020-07-20 RX ORDER — METOPROLOL SUCCINATE 50 MG/1
50 TABLET, EXTENDED RELEASE ORAL DAILY
Qty: 90 TABLET | Refills: 3 | Status: SHIPPED | OUTPATIENT
Start: 2020-07-20 | End: 2021-07-23

## 2020-07-20 NOTE — PROGRESS NOTES
"Mariluz Echeverria is a 80 year old female who is being evaluated via a billable telephone visit.      The patient has been notified of following:     \"This telephone visit will be conducted via a call between you and your physician/provider. We have found that certain health care needs can be provided without the need for a physical exam.  This service lets us provide the care you need with a short phone conversation.  If a prescription is necessary we can send it directly to your pharmacy.  If lab work is needed we can place an order for that and you can then stop by our lab to have the test done at a later time.    Telephone visits are billed at different rates depending on your insurance coverage. During this emergency period, for some insurers they may be billed the same as an in-person visit.  Please reach out to your insurance provider with any questions.    If during the course of the call the physician/provider feels a telephone visit is not appropriate, you will not be charged for this service.\"    Patient has given verbal consent for Telephone visit?  Yes    What phone number would you like to be contacted at? [phone]    How would you like to obtain your AVS? Mail a copy    Subjective     Mariluz Echeverria is a 80 year old female who presents via phone visit today for the following health issues:    HPI     Follow up back/leg problems and MRI that Dr. Bran ordered.     Hypertension Follow-up      Do you check your blood pressure regularly outside of the clinic? Yes     Are you following a low salt diet? Yes    Are your blood pressures ever more than 140 on the top number (systolic) OR more   than 90 on the bottom number (diastolic), for example 140/90? No      How many servings of fruits and vegetables do you eat daily?  2-3    On average, how many sweetened beverages do you drink each day (Examples: soda, juice, sweet tea, etc.  Do NOT count diet or artificially sweetened beverages)?   1    How many " days per week do you exercise enough to make your heart beat faster? 5    How many minutes a day do you exercise enough to make your heart beat faster? 20 - 29    How many days per week do you miss taking your medication? 0      HPI:   She has ongoing leg pain. Was seen by Dr Bran of Neurology. MRI shows multiple levels of Foraminal stenosis. He has recommended a steroid injection at L4-5. She is wondering if she should go through with it.    She does not get exercise because she cannot walk far. Denies any problem with chest pain, pressure or dyspnea on exertion. She does check her blood pressure regular and remains in normal range. Follows a low salt diet and keeps her wt down.  No palpitations.    BP Readings from Last 3 Encounters:   07/20/20 123/76   06/24/20 124/73   09/20/19 132/77    Wt Readings from Last 3 Encounters:   07/20/20 58.5 kg (129 lb)   06/24/20 59 kg (130 lb)   09/20/19 58.5 kg (129 lb)                    Reviewed and updated as needed this visit by Provider         Review of Systems   Constitutional, HEENT, cardiovascular, pulmonary, GI, , musculoskeletal, neuro, skin, endocrine and psych systems are negative, except as otherwise noted.       Objective   Reported vitals:  /76   Temp 97.6  F (36.4  C)   Wt 58.5 kg (129 lb)   Breastfeeding No   BMI 23.03 kg/m     healthy, alert and no distress  PSYCH: Alert and oriented times 3; coherent speech, normal   rate and volume, able to articulate logical thoughts, able   to abstract reason, no tangential thoughts, no hallucinations   or delusions  Her affect is normal  RESP: No cough, no audible wheezing, able to talk in full sentences  Remainder of exam unable to be completed due to telephone visits            Assessment/Plan:    1. Benign essential hypertension  under good control Continue current medications. Will update labs  - amLODIPine (NORVASC) 5 MG tablet; Take one 5 MG pill by mouth in the morning  Dispense: 90 tablet; Refill:  3  - lisinopril-hydrochlorothiazide (ZESTORETIC) 20-12.5 MG tablet; Take 1 tablet by mouth daily  Dispense: 90 tablet; Refill: 3  - metoprolol succinate ER (TOPROL-XL) 50 MG 24 hr tablet; Take 1 tablet (50 mg) by mouth daily  Dispense: 90 tablet; Refill: 3  - **CBC with platelets FUTURE anytime; Future  - **Comprehensive metabolic panel FUTURE anytime; Future  - **TSH with free T4 reflex FUTURE anytime; Future  - Lipid panel reflex to direct LDL Fasting; Future    2. Paroxysmal atrial fibrillation (H)  under good control   - flecainide (TAMBOCOR) 50 MG tablet; Take 1 tablet (50 mg) by mouth 2 times daily  Dispense: 180 tablet; Refill: 3  - lisinopril-hydrochlorothiazide (ZESTORETIC) 20-12.5 MG tablet; Take 1 tablet by mouth daily  Dispense: 90 tablet; Refill: 3    3. Foraminal stenosis of lumbar region  recommended she follow through on steroid injection      No follow-ups on file.      Phone call duration:  8 minutes    Yohana Falk MD

## 2020-07-30 ENCOUNTER — TRANSFERRED RECORDS (OUTPATIENT)
Dept: HEALTH INFORMATION MANAGEMENT | Facility: CLINIC | Age: 80
End: 2020-07-30

## 2020-08-14 DIAGNOSIS — I10 BENIGN ESSENTIAL HYPERTENSION: ICD-10-CM

## 2020-08-14 LAB
ERYTHROCYTE [DISTWIDTH] IN BLOOD BY AUTOMATED COUNT: 14.7 % (ref 10–15)
HCT VFR BLD AUTO: 37.5 % (ref 35–47)
HGB BLD-MCNC: 12.5 G/DL (ref 11.7–15.7)
MCH RBC QN AUTO: 31 PG (ref 26.5–33)
MCHC RBC AUTO-ENTMCNC: 33.3 G/DL (ref 31.5–36.5)
MCV RBC AUTO: 93 FL (ref 78–100)
PLATELET # BLD AUTO: 222 10E9/L (ref 150–450)
RBC # BLD AUTO: 4.03 10E12/L (ref 3.8–5.2)
WBC # BLD AUTO: 4.7 10E9/L (ref 4–11)

## 2020-08-14 PROCEDURE — 85027 COMPLETE CBC AUTOMATED: CPT | Performed by: INTERNAL MEDICINE

## 2020-08-14 PROCEDURE — 36415 COLL VENOUS BLD VENIPUNCTURE: CPT | Performed by: INTERNAL MEDICINE

## 2020-08-14 PROCEDURE — 84443 ASSAY THYROID STIM HORMONE: CPT | Performed by: INTERNAL MEDICINE

## 2020-08-14 PROCEDURE — 80053 COMPREHEN METABOLIC PANEL: CPT | Performed by: INTERNAL MEDICINE

## 2020-08-14 PROCEDURE — 80061 LIPID PANEL: CPT | Performed by: INTERNAL MEDICINE

## 2020-08-15 LAB
ALBUMIN SERPL-MCNC: 3.4 G/DL (ref 3.4–5)
ALP SERPL-CCNC: 50 U/L (ref 40–150)
ALT SERPL W P-5'-P-CCNC: 21 U/L (ref 0–50)
ANION GAP SERPL CALCULATED.3IONS-SCNC: 7 MMOL/L (ref 3–14)
AST SERPL W P-5'-P-CCNC: 22 U/L (ref 0–45)
BILIRUB SERPL-MCNC: 0.7 MG/DL (ref 0.2–1.3)
BUN SERPL-MCNC: 17 MG/DL (ref 7–30)
CALCIUM SERPL-MCNC: 9 MG/DL (ref 8.5–10.1)
CHLORIDE SERPL-SCNC: 102 MMOL/L (ref 94–109)
CHOLEST SERPL-MCNC: 233 MG/DL
CO2 SERPL-SCNC: 28 MMOL/L (ref 20–32)
CREAT SERPL-MCNC: 0.8 MG/DL (ref 0.52–1.04)
GFR SERPL CREATININE-BSD FRML MDRD: 70 ML/MIN/{1.73_M2}
GLUCOSE SERPL-MCNC: 90 MG/DL (ref 70–99)
HDLC SERPL-MCNC: 105 MG/DL
LDLC SERPL CALC-MCNC: 121 MG/DL
NONHDLC SERPL-MCNC: 128 MG/DL
POTASSIUM SERPL-SCNC: 3.8 MMOL/L (ref 3.4–5.3)
PROT SERPL-MCNC: 6.5 G/DL (ref 6.8–8.8)
SODIUM SERPL-SCNC: 137 MMOL/L (ref 133–144)
TRIGL SERPL-MCNC: 34 MG/DL
TSH SERPL DL<=0.005 MIU/L-ACNC: 1.52 MU/L (ref 0.4–4)

## 2020-08-27 ENCOUNTER — OFFICE VISIT (OUTPATIENT)
Dept: INTERNAL MEDICINE | Facility: CLINIC | Age: 80
End: 2020-08-27
Payer: COMMERCIAL

## 2020-08-27 VITALS
OXYGEN SATURATION: 97 % | DIASTOLIC BLOOD PRESSURE: 71 MMHG | HEIGHT: 63 IN | RESPIRATION RATE: 16 BRPM | HEART RATE: 60 BPM | WEIGHT: 128.8 LBS | SYSTOLIC BLOOD PRESSURE: 116 MMHG | BODY MASS INDEX: 22.82 KG/M2 | TEMPERATURE: 98.7 F

## 2020-08-27 DIAGNOSIS — Z00.00 PREVENTATIVE HEALTH CARE: Primary | ICD-10-CM

## 2020-08-27 DIAGNOSIS — I10 ESSENTIAL HYPERTENSION, BENIGN: ICD-10-CM

## 2020-08-27 DIAGNOSIS — I48.0 PAROXYSMAL ATRIAL FIBRILLATION (H): ICD-10-CM

## 2020-08-27 PROCEDURE — 90471 IMMUNIZATION ADMIN: CPT | Performed by: INTERNAL MEDICINE

## 2020-08-27 PROCEDURE — 99397 PER PM REEVAL EST PAT 65+ YR: CPT | Mod: 25 | Performed by: INTERNAL MEDICINE

## 2020-08-27 PROCEDURE — 90715 TDAP VACCINE 7 YRS/> IM: CPT | Performed by: INTERNAL MEDICINE

## 2020-08-27 ASSESSMENT — ENCOUNTER SYMPTOMS
DIARRHEA: 0
HEMATURIA: 0
NAUSEA: 0
JOINT SWELLING: 1
FREQUENCY: 0
SHORTNESS OF BREATH: 0
DIZZINESS: 0
CHILLS: 0
NERVOUS/ANXIOUS: 0
ARTHRALGIAS: 1
CONSTIPATION: 0
HEMATOCHEZIA: 0
SORE THROAT: 0
DYSURIA: 0
COUGH: 0
ABDOMINAL PAIN: 0
MYALGIAS: 0
HEARTBURN: 0
EYE PAIN: 0
PARESTHESIAS: 0
PALPITATIONS: 0
WEAKNESS: 0
HEADACHES: 0
FEVER: 0
BREAST MASS: 0

## 2020-08-27 ASSESSMENT — MIFFLIN-ST. JEOR: SCORE: 1020.42

## 2020-08-27 ASSESSMENT — ACTIVITIES OF DAILY LIVING (ADL): CURRENT_FUNCTION: NO ASSISTANCE NEEDED

## 2020-08-27 NOTE — PROGRESS NOTES
"SUBJECTIVE:   Mariluz Echeverria is a 80 year old female who presents for Preventive Visit.    Non-fasting. Labs done 8-.    Are you in the first 12 months of your Medicare coverage?  No    Healthy Habits:     In general, how would you rate your overall health?  Good    Frequency of exercise:  4-5 days/week    Duration of exercise:  30-45 minutes    Do you usually eat at least 4 servings of fruit and vegetables a day, include whole grains    & fiber and avoid regularly eating high fat or \"junk\" foods?  Yes    Medication side effects:  None    Ability to successfully perform activities of daily living:  No assistance needed    Home Safety:  Lack of grab bars in the bathroom    Hearing Impairment:  Difficulty understanding soft or whispered speech    In the past 6 months, have you been bothered by leaking of urine?  No    In general, how would you rate your overall mental or emotional health?  Good      PHQ-2 Total Score: 0    Additional concerns today:  Yes    Do you feel safe in your environment? Yes    Have you ever done Advance Care Planning? (For example, a Health Directive, POLST, or a discussion with a medical provider or your loved ones about your wishes): Yes, advance care planning is on file.      Fall risk  Fallen 2 or more times in the past year?: No  Any fall with injury in the past year?: No    Cognitive Screening   1) Repeat 3 items (Leader, Season, Table)    2) Clock draw: NORMAL  3) 3 item recall: Recalls 3 objects  Results: 3 items recalled: COGNITIVE IMPAIRMENT LESS LIKELY    Mini-CogTM Copyright KATLYN Mata. Licensed by the author for use in [facility]; reprinted with permission (miriam@.[facility]). All rights reserved.      Do you have sleep apnea, excessive snoring or daytime drowsiness?: no    Reviewed and updated as needed this visit by clinical staff  Tobacco  Allergies  Med Hx  Surg Hx  Fam Hx  Soc Hx        Reviewed and updated as needed this visit by Provider        Social " History     Tobacco Use     Smoking status: Former Smoker     Smokeless tobacco: Never Used   Substance Use Topics     Alcohol use: Yes     Alcohol/week: 0.0 standard drinks     Comment: 1 glass wine per day     If you drink alcohol do you typically have >3 drinks per day or >7 drinks per week? No    Alcohol Use 8/27/2020   Prescreen: >3 drinks/day or >7 drinks/week? No   Prescreen: >3 drinks/day or >7 drinks/week? -       Current providers sharing in care for this patient include:   Patient Care Team:  Yohana Falk MD as PCP - General (Internal Medicine)  Yohana Falk MD as Assigned PCP    The following health maintenance items are reviewed in Epic and correct as of today:  Health Maintenance   Topic Date Due     DTAP/TDAP/TD IMMUNIZATION (3 - Td) 09/03/2019     MEDICARE ANNUAL WELLNESS VISIT  05/15/2020     FALL RISK ASSESSMENT  05/15/2020     INFLUENZA VACCINE (1) 09/01/2020     COLORECTAL CANCER SCREENING  08/15/2022     ADVANCE CARE PLANNING  07/25/2023     LIPID  08/14/2025     DEXA  Completed     PHQ-2  Completed     ZOSTER IMMUNIZATION  Completed     IPV IMMUNIZATION  Aged Out     MENINGITIS IMMUNIZATION  Aged Out     HEPATITIS B IMMUNIZATION  Aged Out     PNEUMOCOCCAL IMMUNIZATION 65+ LOW/MEDIUM RISK  Discontinued     BP Readings from Last 3 Encounters:   08/27/20 116/71   07/20/20 123/76   06/24/20 124/73    Wt Readings from Last 3 Encounters:   08/27/20 58.4 kg (128 lb 12.8 oz)   07/20/20 58.5 kg (129 lb)   06/24/20 59 kg (130 lb)                      Review of Systems   Constitutional: Negative for chills and fever.   HENT: Negative for congestion, ear pain, hearing loss and sore throat.    Eyes: Negative for pain and visual disturbance.   Respiratory: Negative for cough and shortness of breath.    Cardiovascular: Negative for chest pain, palpitations and peripheral edema.   Gastrointestinal: Negative for abdominal pain, constipation, diarrhea, heartburn, hematochezia and nausea.   Breasts:  " Positive for tenderness. Negative for breast mass and discharge.   Genitourinary: Negative for dysuria, frequency, genital sores, hematuria, pelvic pain, urgency, vaginal bleeding and vaginal discharge.   Musculoskeletal: Positive for arthralgias and joint swelling. Negative for myalgias.   Skin: Negative for rash.   Neurological: Negative for dizziness, weakness, headaches and paresthesias.   Psychiatric/Behavioral: Negative for mood changes. The patient is not nervous/anxious.      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 Followed by Dr Bran Neurology     OBJECTIVE:   /71 (BP Location: Right arm, Patient Position: Chair, Cuff Size: Adult Regular)   Pulse 60   Temp 98.7  F (37.1  C) (Oral)   Resp 16   Ht 1.588 m (5' 2.5\")   Wt 58.4 kg (128 lb 12.8 oz)   SpO2 97%   Breastfeeding No   BMI 23.18 kg/m   Estimated body mass index is 23.18 kg/m  as calculated from the following:    Height as of this encounter: 1.588 m (5' 2.5\").    Weight as of this encounter: 58.4 kg (128 lb 12.8 oz).  Physical Exam  GENERAL: healthy, alert and no distress  EYES: Eyes grossly normal to inspection, PERRL and conjunctivae and sclerae normal  HENT: ear canals and TM's normal, nose and mouth without ulcers or lesions  NECK: no adenopathy, no asymmetry, masses, or scars and thyroid normal to palpation  RESP: lungs clear to auscultation - no rales, rhonchi or wheezes  CV: regular rate and rhythm, normal S1 S2, no S3 or S4, no murmur, click or rub, no peripheral edema and peripheral pulses strong  ABDOMEN: soft, nontender, no hepatosplenomegaly, no masses and bowel sounds normal  MS: no gross musculoskeletal defects noted, no edema  SKIN: no suspicious lesions or rashes  NEURO: Normal strength and tone, mentation intact and speech normal  PSYCH: mentation appears normal, affect normal/bright      ASSESSMENT / PLAN:   1. Preventative health care  Labs up todate    2. Essential hypertension, benign  under good control " "Continue current medications.     3. Paroxysmal atrial fibrillation (H)  stable      COUNSELING:  Reviewed preventive health counseling, as reflected in patient instructions       Regular exercise       Healthy diet/nutrition    Estimated body mass index is 23.18 kg/m  as calculated from the following:    Height as of this encounter: 1.588 m (5' 2.5\").    Weight as of this encounter: 58.4 kg (128 lb 12.8 oz).        She reports that she has quit smoking. She has never used smokeless tobacco.      Appropriate preventive services were discussed with this patient, including applicable screening as appropriate for cardiovascular disease, diabetes, osteopenia/osteoporosis, and glaucoma.  As appropriate for age/gender, discussed screening for colorectal cancer, prostate cancer, breast cancer, and cervical cancer. Checklist reviewing preventive services available has been given to the patient.    Reviewed patients plan of care and provided an AVS. The Basic Care Plan (routine screening as documented in Health Maintenance) for Mariluz meets the Care Plan requirement. This Care Plan has been established and reviewed with the Patient.    Counseling Resources:  ATP IV Guidelines  Pooled Cohorts Equation Calculator  Breast Cancer Risk Calculator  Breast Cancer: Medication to Reduce Risk  FRAX Risk Assessment  ICSI Preventive Guidelines  Dietary Guidelines for Americans, 2010  [employer]'s MyPlate  ASA Prophylaxis  Lung CA Screening    Yohana Falk MD  [facility]    Identified Health Risks:  "

## 2021-06-22 NOTE — H&P
Pre-Endoscopy History and Physical     Mariluz Echeverria MRN# [id number]   YOB: 1940 Age: 77 year old     Date of Procedure: 8/15/2017  Primary care provider: Yohana Falk  Type of Endoscopy: colonoscopy  Reason for Procedure: rectal bleeding  Type of Anesthesia Anticipated: Moderate Sedation    HPI:    Mariluz is a 77 year old female who will be undergoing the above procedure.      A history and physical has been performed. The patient's medications and allergies have been reviewed. The risks and benefits of the procedure and the sedation options and risks were discussed with the patient.  All questions were answered and informed consent was obtained.      She denies a personal or family history of anesthesia complications or bleeding disorders.     Allergies   Allergen Reactions     Meperidine Hcl      demerol= nausea        Prior to Admission Medications   Prescriptions Last Dose Informant Patient Reported? Taking?   Calcium Carbonate-Vitamin D (CALCIUM 600 + D OR) Past Week at Unknown time  Yes Yes   Sig: Take 1 tablet by mouth daily   Multiple Vitamins-Minerals (PRESERVISION/LUTEIN PO) Past Week at Unknown time  Yes Yes   Sig: Take 2 capsules by mouth daily   Psyllium (METAMUCIL FIBER PO) 8/10/2017 at Unknown time  Yes Yes   Sig: Take by mouth daily   amLODIPine (NORVASC) 5 MG tablet 8/15/2017 at Unknown time  No Yes   Sig: Take one 5 MG pill by mouth in the morning   aspirin 81 MG tablet 8/10/2017 at Unknown time  Yes Yes   Sig: Take by mouth daily   flecainide (TAMBOCOR) 50 MG tablet 8/15/2017 at Unknown time  No Yes   Sig: Take 1 tablet (50 mg) by mouth 2 times daily   lisinopril-hydrochlorothiazide (PRINZIDE/ZESTORETIC) 20-12.5 MG per tablet 8/15/2017 at Unknown time  No Yes   Sig: Take 2 tablets by mouth daily   metoprolol (TOPROL-XL) 50 MG 24 hr tablet 8/15/2017 at Unknown time  No Yes   Sig: Take 1 tablet (50 mg) by mouth daily      Facility-Administered Medications: None       Patient  Active Problem List   Diagnosis     Arthropathy of pelvic region and thigh     Diffuse cystic mastopathy     Urethral stricture     Essential hypertension, benign     Postmenopausal atrophic vaginitis     CARDIOVASCULAR SCREENING; LDL GOAL LESS THAN 130     Advanced directives, counseling/discussion     Urinary retention     First degree AV block     S/P AV conner ablation     Paroxysmal atrial fibrillation (H)        Past Medical History:   Diagnosis Date     Actinic keratosis     multiple, neris on back      Atrial fibrillation (H)     ablation @ [facility] 1/4/07     Diffuse cystic mastopathy     has prominent chronic L breast cystic structure     Essential hypertension, benign      First degree AV block 5/9/2016    Per Holter 9/18/15      Generalized osteoarthrosis, unspecified site     hands, L hip     Leiomyoma of uterus, unspecified      S/P AV conner ablation     At [facility], remote history      Unspecified arthropathy, pelvic region and thigh     L hip     Urethral stricture unspecified     inactive        Past Surgical History:   Procedure Laterality Date     C NONSPECIFIC PROCEDURE      numerous breast aspirations     C NONSPECIFIC PROCEDURE      T&A     C NONSPECIFIC PROCEDURE      left breast bx     C NONSPECIFIC PROCEDURE      numerous breast aspirations     C NONSPECIFIC PROCEDURE      tubal lig (remote)     s/p AV conner ablation      At [facility], remote history      wisdom teeth         Social History   Substance Use Topics     Smoking status: Former Smoker     Smokeless tobacco: Never Used     Alcohol use 0.0 oz/week     0 Standard drinks or equivalent per week      Comment: 1 glass wine per day       Family History   Problem Relation Age of Onset     Arthritis Mother      Neurologic Disorder Mother      parkinsons     Hypertension Mother      Hypertension Father      CEREBROVASCULAR DISEASE Father      2     HEART DISEASE Father      mi x 2     GASTROINTESTINAL DISEASE Father      bleeding ulcers      "CANCER Father      prostate     Hypertension Brother      Breast Cancer Maternal Grandmother        REVIEW OF SYSTEMS:     5 point ROS negative except as noted above in HPI, including Gen., Resp., CV, GI &  system review.      PHYSICAL EXAM:   There were no vitals taken for this visit. Estimated body mass index is 21.74 kg/(m^2) as calculated from the following:    Height as of 8/8/17: 1.6 m (5' 3\").    Weight as of 8/8/17: 55.7 kg (122 lb 11.2 oz).   GENERAL APPEARANCE: healthy and alert  MENTAL STATUS: alert  AIRWAY EXAM: Mallampatti Class II (visualization of the soft palate, fauces, and uvula)  RESP: lungs clear to auscultation - no rales, rhonchi or wheezes  CV: regular rates and rhythm      DIAGNOSTICS:    Not indicated      IMPRESSION   ASA Class 2 - Mild systemic disease        PLAN:       Plan for colonoscopy. We discussed the risks, benefits and alternatives and the patient wished to proceed.    The above has been forwarded to the consulting provider.      Signed Electronically by: Leonarda Gaona MD  August 15, 2017    " Siliq Counseling:  I discussed with the patient the risks of Siliq including but not limited to new or worsening depression, suicidal thoughts and behavior, immunosuppression, malignancy, posterior leukoencephalopathy syndrome, and serious infections.  The patient understands that monitoring is required including a PPD at baseline and must alert us or the primary physician if symptoms of infection or other concerning signs are noted. There is also a special program designed to monitor depression which is required with Siliq.

## 2021-07-12 ENCOUNTER — HOSPITAL ENCOUNTER (OUTPATIENT)
Dept: MAMMOGRAPHY | Facility: CLINIC | Age: 81
Discharge: HOME | End: 2021-07-12
Attending: INTERNAL MEDICINE | Admitting: INTERNAL MEDICINE
Payer: COMMERCIAL

## 2021-07-12 DIAGNOSIS — Z12.31 VISIT FOR SCREENING MAMMOGRAM: ICD-10-CM

## 2021-07-12 PROCEDURE — 77063 BREAST TOMOSYNTHESIS BI: CPT

## 2021-07-23 DIAGNOSIS — I10 BENIGN ESSENTIAL HYPERTENSION: ICD-10-CM

## 2021-07-23 DIAGNOSIS — I48.0 PAROXYSMAL ATRIAL FIBRILLATION (H): ICD-10-CM

## 2021-07-23 RX ORDER — AMLODIPINE BESYLATE 5 MG/1
TABLET ORAL
Qty: 90 TABLET | Refills: 0 | Status: SHIPPED | OUTPATIENT
Start: 2021-07-23 | End: 2021-08-18

## 2021-07-23 RX ORDER — LISINOPRIL AND HYDROCHLOROTHIAZIDE 12.5; 2 MG/1; MG/1
1 TABLET ORAL DAILY
Qty: 90 TABLET | Refills: 0 | Status: SHIPPED | OUTPATIENT
Start: 2021-07-23 | End: 2021-08-18

## 2021-07-23 RX ORDER — METOPROLOL SUCCINATE 50 MG/1
50 TABLET, EXTENDED RELEASE ORAL DAILY
Qty: 90 TABLET | Refills: 0 | Status: SHIPPED | OUTPATIENT
Start: 2021-07-23 | End: 2021-08-18

## 2021-07-23 NOTE — TELEPHONE ENCOUNTER
Routing refill request to provider for review/approval because:  Drug not on the [facility] refill protocol   Kenia Pearson RN, BSN

## 2021-07-26 RX ORDER — FLECAINIDE ACETATE 50 MG/1
50 TABLET ORAL 2 TIMES DAILY
Qty: 180 TABLET | Refills: 3 | Status: SHIPPED | OUTPATIENT
Start: 2021-07-26

## 2021-08-17 ENCOUNTER — TRANSFERRED RECORDS (OUTPATIENT)
Dept: HEALTH INFORMATION MANAGEMENT | Facility: CLINIC | Age: 81
End: 2021-08-17

## 2021-08-18 ENCOUNTER — OFFICE VISIT (OUTPATIENT)
Dept: CARDIOLOGY | Facility: CLINIC | Age: 81
End: 2021-08-18
Payer: COMMERCIAL

## 2021-08-18 ENCOUNTER — DOCUMENTATION ONLY (OUTPATIENT)
Dept: LAB | Facility: CLINIC | Age: 81
End: 2021-08-18

## 2021-08-18 VITALS
WEIGHT: 125.3 LBS | HEART RATE: 58 BPM | OXYGEN SATURATION: 97 % | BODY MASS INDEX: 22.2 KG/M2 | SYSTOLIC BLOOD PRESSURE: 112 MMHG | DIASTOLIC BLOOD PRESSURE: 68 MMHG | HEIGHT: 63 IN

## 2021-08-18 DIAGNOSIS — I10 BENIGN ESSENTIAL HYPERTENSION: ICD-10-CM

## 2021-08-18 DIAGNOSIS — I10 ESSENTIAL HYPERTENSION, BENIGN: Primary | ICD-10-CM

## 2021-08-18 DIAGNOSIS — I48.0 PAROXYSMAL ATRIAL FIBRILLATION (H): Primary | ICD-10-CM

## 2021-08-18 PROCEDURE — 93000 ELECTROCARDIOGRAM COMPLETE: CPT | Performed by: INTERNAL MEDICINE

## 2021-08-18 PROCEDURE — 99214 OFFICE O/P EST MOD 30 MIN: CPT | Performed by: INTERNAL MEDICINE

## 2021-08-18 RX ORDER — METOPROLOL SUCCINATE 50 MG/1
50 TABLET, EXTENDED RELEASE ORAL DAILY
Qty: 90 TABLET | Refills: 3 | Status: SHIPPED | OUTPATIENT
Start: 2021-08-18

## 2021-08-18 RX ORDER — LISINOPRIL AND HYDROCHLOROTHIAZIDE 12.5; 2 MG/1; MG/1
1 TABLET ORAL DAILY
Qty: 90 TABLET | Refills: 3 | Status: SHIPPED | OUTPATIENT
Start: 2021-08-18

## 2021-08-18 RX ORDER — AMLODIPINE BESYLATE 5 MG/1
TABLET ORAL
Qty: 90 TABLET | Refills: 3 | Status: SHIPPED | OUTPATIENT
Start: 2021-08-18

## 2021-08-18 ASSESSMENT — MIFFLIN-ST. JEOR: SCORE: 999.55

## 2021-08-18 NOTE — PROGRESS NOTES
HPI and Plan:   See dictation    Orders Placed This Encounter   Procedures     Follow-Up with Cardiologist     EKG 12-lead complete w/read - Clinics (to be scheduled)       Orders Placed This Encounter   Medications     amLODIPine (NORVASC) 5 MG tablet     Sig: Take one 5 MG pill by mouth in the morning     Dispense:  90 tablet     Refill:  3     lisinopril-hydrochlorothiazide (ZESTORETIC) 20-12.5 MG tablet     Sig: Take 1 tablet by mouth daily     Dispense:  90 tablet     Refill:  3     metoprolol succinate ER (TOPROL-XL) 50 MG 24 hr tablet     Sig: Take 1 tablet (50 mg) by mouth daily     Dispense:  90 tablet     Refill:  3       Medications Discontinued During This Encounter   Medication Reason     metoprolol succinate ER (TOPROL-XL) 50 MG 24 hr tablet Reorder     lisinopril-hydrochlorothiazide (ZESTORETIC) 20-12.5 MG tablet Reorder     amLODIPine (NORVASC) 5 MG tablet Reorder         Encounter Diagnoses   Name Primary?     Paroxysmal atrial fibrillation (H) Yes     Benign essential hypertension        CURRENT MEDICATIONS:  Current Outpatient Medications   Medication Sig Dispense Refill     amLODIPine (NORVASC) 5 MG tablet Take one 5 MG pill by mouth in the morning 90 tablet 3     Calcium Carbonate-Vitamin D (CALCIUM 600 + D OR) Take 1 tablet by mouth daily       flecainide (TAMBOCOR) 50 MG tablet Take 1 tablet (50 mg) by mouth 2 times daily 180 tablet 3     lidocaine (LMX4) 4 % external cream Apply 1 g topically every 3 hours as needed for pain (of the right great toe) Cancel previous script 45 g 1     lisinopril-hydrochlorothiazide (ZESTORETIC) 20-12.5 MG tablet Take 1 tablet by mouth daily 90 tablet 3     metoprolol succinate ER (TOPROL-XL) 50 MG 24 hr tablet Take 1 tablet (50 mg) by mouth daily 90 tablet 3     Multiple Vitamins-Minerals (PRESERVISION/LUTEIN PO) Take 2 capsules by mouth daily         ALLERGIES     Allergies   Allergen Reactions     Meperidine Hcl      demerol= nausea       PAST MEDICAL  HISTORY:  Past Medical History:   Diagnosis Date     Actinic keratosis     multiple, neris on back      Atrial fibrillation (H)     ablation @ [facility] 1/4/07     Diffuse cystic mastopathy     has prominent chronic L breast cystic structure     Essential hypertension, benign      First degree AV block 5/9/2016    Per Holter 9/18/15      Generalized osteoarthrosis, unspecified site     hands, L hip     Leiomyoma of uterus, unspecified      S/P AV conner ablation     At [facility], remote history      Unspecified arthropathy, pelvic region and thigh     L hip     Urethral stricture unspecified     inactive       PAST SURGICAL HISTORY:  Past Surgical History:   Procedure Laterality Date     COLONOSCOPY N/A 8/15/2017    Procedure: COMBINED COLONOSCOPY, SINGLE OR MULTIPLE BIOPSY/POLYPECTOMY BY BIOPSY;  COLONOSCOPY WITH POLYPECTOMY USING COLD FORCEPS;  Surgeon: Leonarda Gaona MD;  Location:  GI     s/p AV conner ablation  2007    At [facility], remote history      wisdom teeth       ZZC NONSPECIFIC PROCEDURE      numerous breast aspirations     ZZC NONSPECIFIC PROCEDURE      T&A     ZZC NONSPECIFIC PROCEDURE      left breast bx     ZZC NONSPECIFIC PROCEDURE      numerous breast aspirations     ZZC NONSPECIFIC PROCEDURE      tubal lig (remote)       FAMILY HISTORY:  Family History   Problem Relation Age of Onset     Arthritis Mother      Neurologic Disorder Mother         parkinsons     Hypertension Mother      Hypertension Father      Cerebrovascular Disease Father         2     Heart Disease Father         mi x 2     Gastrointestinal Disease Father         bleeding ulcers     Cancer Father         prostate     Hypertension Brother      Breast Cancer Maternal Grandmother        SOCIAL HISTORY:  Social History     Socioeconomic History     Marital status:      Spouse name: Jere     Number of children: 3     Years of education: None     Highest education level: None   Occupational History     Occupation: volunteer      Comment: president of  aux[employer]   Tobacco Use     Smoking status: Former Smoker     Types: Cigarettes     Quit date:      Years since quittin.6     Smokeless tobacco: Never Used   Substance and Sexual Activity     Alcohol use: Yes     Alcohol/week: 0.0 standard drinks     Comment: 1 glass wine per day     Drug use: No     Sexual activity: Yes     Partners: Male     Birth control/protection: Post-menopausal   Other Topics Concern     Parent/sibling w/ CABG, MI or angioplasty before 65F 55M? Not Asked      Service Not Asked     Blood Transfusions Not Asked     Caffeine Concern No     Comment: none     Occupational Exposure Not Asked     Hobby Hazards Not Asked     Sleep Concern Not Asked     Stress Concern Not Asked     Weight Concern Not Asked     Special Diet Not Asked     Back Care Not Asked     Exercise Yes     Comment: 4-5 a week. bike ,walk     Bike Helmet Not Asked     Seat Belt Not Asked     Self-Exams Not Asked   Social History Narrative     None     Social Determinants of Health     Financial Resource Strain:      Difficulty of Paying Living Expenses:    Food Insecurity:      Worried About Running Out of Food in the Last Year:      Ran Out of Food in the Last Year:    Transportation Needs:      Lack of Transportation (Medical):      Lack of Transportation (Non-Medical):    Physical Activity:      Days of Exercise per Week:      Minutes of Exercise per Session:    Stress:      Feeling of Stress :    Social Connections:      Frequency of Communication with Friends and Family:      Frequency of Social Gatherings with Friends and Family:      Attends Yazidism Services:      Active Member of Clubs or Organizations:      Attends Club or Organization Meetings:      Marital Status:    Intimate Partner Violence:      Fear of Current or Ex-Partner:      Emotionally Abused:      Physically Abused:      Sexually Abused:        Review of Systems:  Skin:  Negative       Eyes:  Positive for glasses  "   ENT:  Positive for hearing loss    Respiratory:  Negative       Cardiovascular:  Negative      Gastroenterology: Negative      Genitourinary:  Negative      Musculoskeletal:  Positive for arthritis    Neurologic:  Positive for numbness or tingling of feet right  Psychiatric:  Negative      Heme/Lymph/Imm:  Negative      Endocrine:  Positive for hot flashes;night sweats      Physical Exam:  Vitals: /68   Pulse 58   Ht 1.588 m (5' 2.5\")   Wt 56.8 kg (125 lb 4.8 oz)   SpO2 97%   BMI 22.55 kg/m      Constitutional:  cooperative, alert and oriented, well developed, well nourished, in no acute distress        Skin:  warm and dry to the touch, no apparent skin lesions or masses noted          Head:  normocephalic, no masses or lesions        Eyes:           Lymph:      ENT:  no pallor or cyanosis, dentition good        Neck:           Respiratory:  normal breath sounds, clear to auscultation, normal A-P diameter, normal symmetry, normal respiratory excursion, no use of accessory muscles         Cardiac: regular rhythm, normal S1/S2, no S3 or S4, apical impulse not displaced, no murmurs, gallops or rubs                pulses full and equal, no bruits auscultated                                        GI:  abdomen soft, non-tender, BS normoactive, no mass, no HSM, no bruits        Extremities and Muscular Skeletal:  no deformities, clubbing, cyanosis, erythema observed;no edema              Neurological:           Psych:           CC  No referring provider defined for this encounter.              "

## 2021-08-18 NOTE — PROGRESS NOTES
Service Date: 08/18/2021    HISTORY OF PRESENT ILLNESS:  I had the opportunity to see Ms. Mariluz Echeverria in Cardiology Clinic today at [facility] in [place] for reevaluation of paroxysmal atrial fibrillation and hypertension.    Ms. Echeverria is an 81-year-old woman with a history of ablation for atrial fibrillation about 12 years ago at [facility].  When she had some recurrences of atrial fibrillation after her ablation, she started seeing me for management.  I started her on flecainide 50 mg p.o. b.i.d. and she has done well since that time without any recognized episodes of recurrent atrial fibrillation in nearly 10 years.  She has not been on anticoagulation for stroke prevention since she apparently has not had any episodes of atrial fibrillation in such a long time.  She believes that she would be quite symptomatic with these episodes since she was symptomatic in the past.    She still remains active, mostly riding her exercise bike and her bicycle outside.  With these activities, she is not experiencing any chest pain, pressure, or tightness.  She denies palpitations, lightheadedness and syncope.  She has not had shortness of breath issues.    We did a 12-lead ECG today, which demonstrated sinus rhythm with nonspecific lateral ST and T-wave abnormalities, unchanged from her last ECG 2 years ago.    PHYSICAL EXAMINATION:  Her blood pressure is 112/68, heart rate 58 and weight 125 pounds.  Her lungs are clear.  Heart rhythm is regular.  She has no cardiac murmurs and no carotid bruits.    IMPRESSIONS:  Ms. Mariluz Echeverria is an 81-year-old woman with paroxysmal atrial fibrillation who underwent AFib ablation about 10 years ago and then had some recurrences of atrial fibrillation shortly after that.  I started her on flecainide, which seemed to prevent the recurrences since then.  She has not had any symptomatic episodes of atrial fibrillation for many years and her ECG confirms that  she remains in sinus rhythm now.  I will continue her flecainide.  I do not think she needs anticoagulation unless she starts having AFib episodes again.  Her blood pressure is well controlled and I encouraged her to continue her exercise activity.    I will plan to follow up with her again next year if they remain in [place] during the summertime.    cc:   Yohana Falk MD   [facility]  [address]  [place] [address]     Ivan Park MD, [facility]        D: 2021   T: 2021   MT: NAVIN    Name:     MELANIA MORENO  MRN:      [id number]        Account:      [id number]   :      1940           Service Date: 2021       Document: [id number]

## 2021-08-23 ENCOUNTER — LAB (OUTPATIENT)
Dept: LAB | Facility: CLINIC | Age: 81
End: 2021-08-23
Payer: COMMERCIAL

## 2021-08-23 DIAGNOSIS — I10 ESSENTIAL HYPERTENSION, BENIGN: ICD-10-CM

## 2021-08-23 LAB
BASOPHILS # BLD AUTO: 0 10E3/UL (ref 0–0.2)
BASOPHILS NFR BLD AUTO: 1 %
EOSINOPHIL # BLD AUTO: 0.1 10E3/UL (ref 0–0.7)
EOSINOPHIL NFR BLD AUTO: 2 %
ERYTHROCYTE [DISTWIDTH] IN BLOOD BY AUTOMATED COUNT: 14.4 % (ref 10–15)
HCT VFR BLD AUTO: 38.3 % (ref 35–47)
HGB BLD-MCNC: 12.5 G/DL (ref 11.7–15.7)
LYMPHOCYTES # BLD AUTO: 1.4 10E3/UL (ref 0.8–5.3)
LYMPHOCYTES NFR BLD AUTO: 27 %
MCH RBC QN AUTO: 30.6 PG (ref 26.5–33)
MCHC RBC AUTO-ENTMCNC: 32.6 G/DL (ref 31.5–36.5)
MCV RBC AUTO: 94 FL (ref 78–100)
MONOCYTES # BLD AUTO: 0.5 10E3/UL (ref 0–1.3)
MONOCYTES NFR BLD AUTO: 10 %
NEUTROPHILS # BLD AUTO: 3 10E3/UL (ref 1.6–8.3)
NEUTROPHILS NFR BLD AUTO: 60 %
PLATELET # BLD AUTO: 225 10E3/UL (ref 150–450)
RBC # BLD AUTO: 4.09 10E6/UL (ref 3.8–5.2)
WBC # BLD AUTO: 5 10E3/UL (ref 4–11)

## 2021-08-23 PROCEDURE — 85025 COMPLETE CBC W/AUTO DIFF WBC: CPT

## 2021-08-23 PROCEDURE — 80061 LIPID PANEL: CPT

## 2021-08-23 PROCEDURE — 80053 COMPREHEN METABOLIC PANEL: CPT

## 2021-08-23 PROCEDURE — 84443 ASSAY THYROID STIM HORMONE: CPT

## 2021-08-23 PROCEDURE — 36415 COLL VENOUS BLD VENIPUNCTURE: CPT

## 2021-08-24 LAB
ALBUMIN SERPL-MCNC: 3.6 G/DL (ref 3.4–5)
ALP SERPL-CCNC: 49 U/L (ref 40–150)
ALT SERPL W P-5'-P-CCNC: 22 U/L (ref 0–50)
ANION GAP SERPL CALCULATED.3IONS-SCNC: 4 MMOL/L (ref 3–14)
AST SERPL W P-5'-P-CCNC: 23 U/L (ref 0–45)
BILIRUB SERPL-MCNC: 0.8 MG/DL (ref 0.2–1.3)
BUN SERPL-MCNC: 17 MG/DL (ref 7–30)
CALCIUM SERPL-MCNC: 9.7 MG/DL (ref 8.5–10.1)
CHLORIDE BLD-SCNC: 103 MMOL/L (ref 94–109)
CHOLEST SERPL-MCNC: 245 MG/DL
CO2 SERPL-SCNC: 31 MMOL/L (ref 20–32)
CREAT SERPL-MCNC: 0.87 MG/DL (ref 0.52–1.04)
FASTING STATUS PATIENT QL REPORTED: YES
GFR SERPL CREATININE-BSD FRML MDRD: 63 ML/MIN/1.73M2
GLUCOSE BLD-MCNC: 83 MG/DL (ref 70–99)
HDLC SERPL-MCNC: 116 MG/DL
LDLC SERPL CALC-MCNC: 121 MG/DL
NONHDLC SERPL-MCNC: 129 MG/DL
POTASSIUM BLD-SCNC: 3.7 MMOL/L (ref 3.4–5.3)
PROT SERPL-MCNC: 6.5 G/DL (ref 6.8–8.8)
SODIUM SERPL-SCNC: 138 MMOL/L (ref 133–144)
TRIGL SERPL-MCNC: 40 MG/DL
TSH SERPL DL<=0.005 MIU/L-ACNC: 1.66 MU/L (ref 0.4–4)

## 2022-05-12 ENCOUNTER — TRANSFERRED RECORDS (OUTPATIENT)
Dept: HEALTH INFORMATION MANAGEMENT | Facility: CLINIC | Age: 82
End: 2022-05-12
Payer: COMMERCIAL

## (undated) DEVICE — KIT ENDO TURNOVER/PROCEDURE W/CLEAN A SCOPE LINERS 103888

## (undated) DEVICE — ENDO FORCEP ENDOJAW BIOPSY 2.8MMX230CM FB-220U

## (undated) RX ORDER — FENTANYL CITRATE 50 UG/ML
INJECTION, SOLUTION INTRAMUSCULAR; INTRAVENOUS
Status: DISPENSED
Start: 2017-08-15